# Patient Record
Sex: MALE | Race: WHITE | Employment: OTHER | URBAN - METROPOLITAN AREA
[De-identification: names, ages, dates, MRNs, and addresses within clinical notes are randomized per-mention and may not be internally consistent; named-entity substitution may affect disease eponyms.]

---

## 2017-06-26 ENCOUNTER — HOSPITAL ENCOUNTER (OUTPATIENT)
Age: 72
Setting detail: SPECIMEN
Discharge: HOME | End: 2017-06-26
Payer: MEDICARE

## 2017-06-26 DIAGNOSIS — Z12.5 PROSTATE CANCER SCREENING: ICD-10-CM

## 2017-06-26 DIAGNOSIS — E55.9 VITAMIN D DEFICIENCY: ICD-10-CM

## 2017-06-26 DIAGNOSIS — E78.5 DYSLIPIDEMIA: ICD-10-CM

## 2017-06-26 DIAGNOSIS — I10 ESSENTIAL HYPERTENSION: ICD-10-CM

## 2017-06-26 LAB
ALBUMIN SERPL-MCNC: 4.2 G/DL (ref 3.5–5.2)
ALBUMIN/GLOBULIN RATIO: 1.2 (ref 1–2.5)
ALP BLD-CCNC: 64 U/L (ref 40–129)
ALT SERPL-CCNC: 19 U/L (ref 5–41)
ANION GAP SERPL CALCULATED.3IONS-SCNC: 14 MMOL/L (ref 9–17)
AST SERPL-CCNC: 16 U/L
BILIRUB SERPL-MCNC: 0.47 MG/DL (ref 0.3–1.2)
BUN BLDV-MCNC: 17 MG/DL (ref 8–23)
BUN/CREAT BLD: ABNORMAL (ref 9–20)
CALCIUM SERPL-MCNC: 9.5 MG/DL (ref 8.6–10.4)
CHLORIDE BLD-SCNC: 96 MMOL/L (ref 98–107)
CHOLESTEROL/HDL RATIO: 4.3
CHOLESTEROL: 217 MG/DL
CO2: 25 MMOL/L (ref 20–31)
CREAT SERPL-MCNC: 1.19 MG/DL (ref 0.7–1.2)
GFR AFRICAN AMERICAN: >60 ML/MIN
GFR NON-AFRICAN AMERICAN: >60 ML/MIN
GFR SERPL CREATININE-BSD FRML MDRD: ABNORMAL ML/MIN/{1.73_M2}
GFR SERPL CREATININE-BSD FRML MDRD: ABNORMAL ML/MIN/{1.73_M2}
GLUCOSE BLD-MCNC: 89 MG/DL (ref 70–99)
HCT VFR BLD CALC: 45.2 % (ref 41–53)
HDLC SERPL-MCNC: 51 MG/DL
HEMOGLOBIN: 15 G/DL (ref 13.5–17.5)
LDL CHOLESTEROL: 133 MG/DL (ref 0–130)
MCH RBC QN AUTO: 30.8 PG (ref 26–34)
MCHC RBC AUTO-ENTMCNC: 33.3 G/DL (ref 31–37)
MCV RBC AUTO: 92.7 FL (ref 80–100)
PDW BLD-RTO: 14.2 % (ref 12.5–15.4)
PLATELET # BLD: 351 K/UL (ref 140–450)
PMV BLD AUTO: 8.4 FL (ref 6–12)
POTASSIUM SERPL-SCNC: 4.5 MMOL/L (ref 3.7–5.3)
PROSTATE SPECIFIC ANTIGEN: 1.11 UG/L
RBC # BLD: 4.88 M/UL (ref 4.5–5.9)
SODIUM BLD-SCNC: 135 MMOL/L (ref 135–144)
TOTAL PROTEIN: 7.7 G/DL (ref 6.4–8.3)
TRIGL SERPL-MCNC: 167 MG/DL
VITAMIN D 25-HYDROXY: 21.8 NG/ML (ref 30–100)
VLDLC SERPL CALC-MCNC: ABNORMAL MG/DL (ref 1–30)
WBC # BLD: 10 K/UL (ref 3.5–11)

## 2018-02-12 ENCOUNTER — APPOINTMENT (OUTPATIENT)
Dept: CT IMAGING | Age: 73
DRG: 684 | End: 2018-02-12
Payer: MEDICARE

## 2018-02-12 ENCOUNTER — HOSPITAL ENCOUNTER (INPATIENT)
Age: 73
LOS: 2 days | Discharge: HOME | DRG: 684 | End: 2018-02-14
Attending: EMERGENCY MEDICINE | Admitting: FAMILY MEDICINE
Payer: MEDICARE

## 2018-02-12 DIAGNOSIS — N39.0 URINARY TRACT INFECTION WITHOUT HEMATURIA, SITE UNSPECIFIED: ICD-10-CM

## 2018-02-12 DIAGNOSIS — N17.9 AKI (ACUTE KIDNEY INJURY) (HCC): ICD-10-CM

## 2018-02-12 DIAGNOSIS — R33.9 URINARY RETENTION: Primary | ICD-10-CM

## 2018-02-12 LAB
-: ABNORMAL
ABSOLUTE EOS #: 0.2 K/UL (ref 0–0.4)
ABSOLUTE IMMATURE GRANULOCYTE: ABNORMAL K/UL (ref 0–0.3)
ABSOLUTE LYMPH #: 1.5 K/UL (ref 1–4.8)
ABSOLUTE MONO #: 1.2 K/UL (ref 0.1–1.3)
AMORPHOUS: ABNORMAL
ANION GAP SERPL CALCULATED.3IONS-SCNC: 18 MMOL/L (ref 9–17)
BACTERIA: ABNORMAL
BASOPHILS # BLD: 1 % (ref 0–2)
BASOPHILS ABSOLUTE: 0.1 K/UL (ref 0–0.2)
BILIRUBIN URINE: ABNORMAL
BUN BLDV-MCNC: 30 MG/DL (ref 8–23)
BUN/CREAT BLD: ABNORMAL (ref 9–20)
CALCIUM SERPL-MCNC: 9.2 MG/DL (ref 8.6–10.4)
CASTS UA: ABNORMAL /LPF
CHLORIDE BLD-SCNC: 97 MMOL/L (ref 98–107)
CO2: 21 MMOL/L (ref 20–31)
COLOR: ABNORMAL
COMMENT UA: ABNORMAL
CREAT SERPL-MCNC: 2.41 MG/DL (ref 0.7–1.2)
CRYSTALS, UA: ABNORMAL /HPF
DIFFERENTIAL TYPE: ABNORMAL
EOSINOPHILS RELATIVE PERCENT: 2 % (ref 0–4)
EPITHELIAL CELLS UA: ABNORMAL /HPF
GFR AFRICAN AMERICAN: 32 ML/MIN
GFR NON-AFRICAN AMERICAN: 27 ML/MIN
GFR SERPL CREATININE-BSD FRML MDRD: ABNORMAL ML/MIN/{1.73_M2}
GFR SERPL CREATININE-BSD FRML MDRD: ABNORMAL ML/MIN/{1.73_M2}
GLUCOSE BLD-MCNC: 116 MG/DL (ref 70–99)
GLUCOSE URINE: NEGATIVE
HCT VFR BLD CALC: 42.1 % (ref 41–53)
HEMOGLOBIN: 14.4 G/DL (ref 13.5–17.5)
IMMATURE GRANULOCYTES: ABNORMAL %
KETONES, URINE: NEGATIVE
LEUKOCYTE ESTERASE, URINE: NEGATIVE
LYMPHOCYTES # BLD: 12 % (ref 24–44)
MCH RBC QN AUTO: 31.3 PG (ref 26–34)
MCHC RBC AUTO-ENTMCNC: 34.3 G/DL (ref 31–37)
MCV RBC AUTO: 91.4 FL (ref 80–100)
MONOCYTES # BLD: 10 % (ref 1–7)
MUCUS: ABNORMAL
NITRITE, URINE: NEGATIVE
NRBC AUTOMATED: ABNORMAL PER 100 WBC
OTHER OBSERVATIONS UA: ABNORMAL
PDW BLD-RTO: 13.4 % (ref 11.5–14.9)
PH UA: 5 (ref 5–8)
PLATELET # BLD: 254 K/UL (ref 150–450)
PLATELET ESTIMATE: ABNORMAL
PMV BLD AUTO: 8.3 FL (ref 6–12)
POTASSIUM SERPL-SCNC: 4 MMOL/L (ref 3.7–5.3)
PROTEIN UA: ABNORMAL
RBC # BLD: 4.6 M/UL (ref 4.5–5.9)
RBC # BLD: ABNORMAL 10*6/UL
RBC UA: ABNORMAL /HPF
RENAL EPITHELIAL, UA: ABNORMAL /HPF
SEG NEUTROPHILS: 75 % (ref 36–66)
SEGMENTED NEUTROPHILS ABSOLUTE COUNT: 9.6 K/UL (ref 1.3–9.1)
SODIUM BLD-SCNC: 136 MMOL/L (ref 135–144)
SPECIFIC GRAVITY UA: 1.01 (ref 1–1.03)
TRICHOMONAS: ABNORMAL
TURBIDITY: ABNORMAL
URINE HGB: NEGATIVE
UROBILINOGEN, URINE: NORMAL
WBC # BLD: 12.6 K/UL (ref 3.5–11)
WBC # BLD: ABNORMAL 10*3/UL
WBC UA: ABNORMAL /HPF
YEAST: ABNORMAL

## 2018-02-12 PROCEDURE — 85025 COMPLETE CBC W/AUTO DIFF WBC: CPT

## 2018-02-12 PROCEDURE — 96374 THER/PROPH/DIAG INJ IV PUSH: CPT

## 2018-02-12 PROCEDURE — 36415 COLL VENOUS BLD VENIPUNCTURE: CPT

## 2018-02-12 PROCEDURE — 2580000003 HC RX 258: Performed by: EMERGENCY MEDICINE

## 2018-02-12 PROCEDURE — 80048 BASIC METABOLIC PNL TOTAL CA: CPT

## 2018-02-12 PROCEDURE — 99285 EMERGENCY DEPT VISIT HI MDM: CPT

## 2018-02-12 PROCEDURE — 87086 URINE CULTURE/COLONY COUNT: CPT

## 2018-02-12 PROCEDURE — 74176 CT ABD & PELVIS W/O CONTRAST: CPT

## 2018-02-12 PROCEDURE — 6360000002 HC RX W HCPCS: Performed by: EMERGENCY MEDICINE

## 2018-02-12 PROCEDURE — 1200000000 HC SEMI PRIVATE

## 2018-02-12 PROCEDURE — 81001 URINALYSIS AUTO W/SCOPE: CPT

## 2018-02-12 RX ORDER — 0.9 % SODIUM CHLORIDE 0.9 %
1000 INTRAVENOUS SOLUTION INTRAVENOUS ONCE
Status: COMPLETED | OUTPATIENT
Start: 2018-02-12 | End: 2018-02-12

## 2018-02-12 RX ORDER — 0.9 % SODIUM CHLORIDE 0.9 %
100 INTRAVENOUS SOLUTION INTRAVENOUS ONCE
Status: COMPLETED | OUTPATIENT
Start: 2018-02-12 | End: 2018-02-13

## 2018-02-12 RX ORDER — SODIUM CHLORIDE 0.9 % (FLUSH) 0.9 %
10 SYRINGE (ML) INJECTION PRN
Status: DISCONTINUED | OUTPATIENT
Start: 2018-02-12 | End: 2018-02-14 | Stop reason: HOSPADM

## 2018-02-12 RX ADMIN — CEFTRIAXONE SODIUM 1 G: 1 INJECTION, POWDER, FOR SOLUTION INTRAMUSCULAR; INTRAVENOUS at 23:14

## 2018-02-12 RX ADMIN — SODIUM CHLORIDE 1000 ML: 9 INJECTION, SOLUTION INTRAVENOUS at 21:48

## 2018-02-12 ASSESSMENT — PAIN SCALES - GENERAL
PAINLEVEL_OUTOF10: 0
PAINLEVEL_OUTOF10: 5

## 2018-02-12 ASSESSMENT — ENCOUNTER SYMPTOMS
DIARRHEA: 1
COUGH: 0
ABDOMINAL PAIN: 1
VOMITING: 0
NAUSEA: 0
CONSTIPATION: 1
RHINORRHEA: 0
SHORTNESS OF BREATH: 0

## 2018-02-12 ASSESSMENT — PAIN DESCRIPTION - LOCATION: LOCATION: ABDOMEN

## 2018-02-12 ASSESSMENT — PAIN DESCRIPTION - PAIN TYPE: TYPE: ACUTE PAIN

## 2018-02-13 PROBLEM — K56.41 FECAL IMPACTION IN RECTUM (HCC): Status: ACTIVE | Noted: 2018-02-13

## 2018-02-13 PROBLEM — E66.3 OVERWEIGHT (BMI 25.0-29.9): Status: ACTIVE | Noted: 2018-02-13

## 2018-02-13 LAB
ALBUMIN SERPL-MCNC: 3.5 G/DL (ref 3.5–5.2)
ALBUMIN/GLOBULIN RATIO: ABNORMAL (ref 1–2.5)
ALP BLD-CCNC: 42 U/L (ref 40–129)
ALT SERPL-CCNC: 10 U/L (ref 5–41)
ANION GAP SERPL CALCULATED.3IONS-SCNC: 13 MMOL/L (ref 9–17)
AST SERPL-CCNC: 13 U/L
BILIRUB SERPL-MCNC: 0.55 MG/DL (ref 0.3–1.2)
BUN BLDV-MCNC: 24 MG/DL (ref 8–23)
BUN/CREAT BLD: ABNORMAL (ref 9–20)
CALCIUM SERPL-MCNC: 8.8 MG/DL (ref 8.6–10.4)
CHLORIDE BLD-SCNC: 106 MMOL/L (ref 98–107)
CO2: 21 MMOL/L (ref 20–31)
CREAT SERPL-MCNC: 1.63 MG/DL (ref 0.7–1.2)
CULTURE: NO GROWTH
CULTURE: NORMAL
GFR AFRICAN AMERICAN: 51 ML/MIN
GFR NON-AFRICAN AMERICAN: 42 ML/MIN
GFR SERPL CREATININE-BSD FRML MDRD: ABNORMAL ML/MIN/{1.73_M2}
GFR SERPL CREATININE-BSD FRML MDRD: ABNORMAL ML/MIN/{1.73_M2}
GLUCOSE BLD-MCNC: 108 MG/DL (ref 70–99)
HCT VFR BLD CALC: 41.8 % (ref 41–53)
HEMOGLOBIN: 14.2 G/DL (ref 13.5–17.5)
Lab: NORMAL
MCH RBC QN AUTO: 31.3 PG (ref 26–34)
MCHC RBC AUTO-ENTMCNC: 33.9 G/DL (ref 31–37)
MCV RBC AUTO: 92.3 FL (ref 80–100)
NRBC AUTOMATED: NORMAL PER 100 WBC
PDW BLD-RTO: 13.9 % (ref 11.5–14.9)
PLATELET # BLD: 244 K/UL (ref 150–450)
PMV BLD AUTO: 8 FL (ref 6–12)
POTASSIUM SERPL-SCNC: 4.2 MMOL/L (ref 3.7–5.3)
RBC # BLD: 4.53 M/UL (ref 4.5–5.9)
SODIUM BLD-SCNC: 140 MMOL/L (ref 135–144)
SPECIMEN DESCRIPTION: NORMAL
SPECIMEN DESCRIPTION: NORMAL
STATUS: NORMAL
TOTAL PROTEIN: 6.8 G/DL (ref 6.4–8.3)
WBC # BLD: 10 K/UL (ref 3.5–11)

## 2018-02-13 PROCEDURE — 96372 THER/PROPH/DIAG INJ SC/IM: CPT

## 2018-02-13 PROCEDURE — 6370000000 HC RX 637 (ALT 250 FOR IP): Performed by: FAMILY MEDICINE

## 2018-02-13 PROCEDURE — 36415 COLL VENOUS BLD VENIPUNCTURE: CPT

## 2018-02-13 PROCEDURE — 2580000003 HC RX 258: Performed by: EMERGENCY MEDICINE

## 2018-02-13 PROCEDURE — 2580000003 HC RX 258: Performed by: FAMILY MEDICINE

## 2018-02-13 PROCEDURE — 1200000000 HC SEMI PRIVATE

## 2018-02-13 PROCEDURE — 80053 COMPREHEN METABOLIC PANEL: CPT

## 2018-02-13 PROCEDURE — 6360000002 HC RX W HCPCS: Performed by: FAMILY MEDICINE

## 2018-02-13 PROCEDURE — 99223 1ST HOSP IP/OBS HIGH 75: CPT | Performed by: FAMILY MEDICINE

## 2018-02-13 PROCEDURE — 85027 COMPLETE CBC AUTOMATED: CPT

## 2018-02-13 RX ORDER — ACETAMINOPHEN 325 MG/1
650 TABLET ORAL EVERY 4 HOURS PRN
Status: DISCONTINUED | OUTPATIENT
Start: 2018-02-13 | End: 2018-02-14 | Stop reason: HOSPADM

## 2018-02-13 RX ORDER — OXYCODONE HYDROCHLORIDE AND ACETAMINOPHEN 5; 325 MG/1; MG/1
1 TABLET ORAL EVERY 6 HOURS PRN
Status: DISCONTINUED | OUTPATIENT
Start: 2018-02-13 | End: 2018-02-14 | Stop reason: HOSPADM

## 2018-02-13 RX ORDER — LISINOPRIL 10 MG/1
10 TABLET ORAL DAILY
Status: DISCONTINUED | OUTPATIENT
Start: 2018-02-13 | End: 2018-02-14 | Stop reason: HOSPADM

## 2018-02-13 RX ORDER — AMLODIPINE BESYLATE 5 MG/1
5 TABLET ORAL DAILY
Status: DISCONTINUED | OUTPATIENT
Start: 2018-02-13 | End: 2018-02-14 | Stop reason: HOSPADM

## 2018-02-13 RX ORDER — ASPIRIN 81 MG/1
81 TABLET ORAL DAILY
Status: DISCONTINUED | OUTPATIENT
Start: 2018-02-13 | End: 2018-02-14 | Stop reason: HOSPADM

## 2018-02-13 RX ORDER — AMLODIPINE BESYLATE AND BENAZEPRIL HYDROCHLORIDE 5; 10 MG/1; MG/1
1 CAPSULE ORAL DAILY
Status: DISCONTINUED | OUTPATIENT
Start: 2018-02-13 | End: 2018-02-13 | Stop reason: CLARIF

## 2018-02-13 RX ORDER — ALBUTEROL SULFATE 2.5 MG/3ML
2.5 SOLUTION RESPIRATORY (INHALATION) EVERY 6 HOURS PRN
Status: DISCONTINUED | OUTPATIENT
Start: 2018-02-13 | End: 2018-02-14 | Stop reason: HOSPADM

## 2018-02-13 RX ORDER — SODIUM CHLORIDE 0.9 % (FLUSH) 0.9 %
10 SYRINGE (ML) INJECTION EVERY 12 HOURS SCHEDULED
Status: DISCONTINUED | OUTPATIENT
Start: 2018-02-13 | End: 2018-02-14 | Stop reason: HOSPADM

## 2018-02-13 RX ORDER — SODIUM CHLORIDE 9 MG/ML
INJECTION, SOLUTION INTRAVENOUS CONTINUOUS
Status: DISCONTINUED | OUTPATIENT
Start: 2018-02-13 | End: 2018-02-14 | Stop reason: HOSPADM

## 2018-02-13 RX ORDER — ONDANSETRON 2 MG/ML
4 INJECTION INTRAMUSCULAR; INTRAVENOUS EVERY 6 HOURS PRN
Status: DISCONTINUED | OUTPATIENT
Start: 2018-02-13 | End: 2018-02-14 | Stop reason: HOSPADM

## 2018-02-13 RX ORDER — GABAPENTIN 300 MG/1
300 CAPSULE ORAL 3 TIMES DAILY
Status: DISCONTINUED | OUTPATIENT
Start: 2018-02-13 | End: 2018-02-14 | Stop reason: HOSPADM

## 2018-02-13 RX ORDER — ACETAMINOPHEN 160 MG
2000 TABLET,DISINTEGRATING ORAL DAILY
COMMUNITY

## 2018-02-13 RX ORDER — ASPIRIN 81 MG/1
81 TABLET ORAL DAILY
COMMUNITY

## 2018-02-13 RX ORDER — SODIUM CHLORIDE 0.9 % (FLUSH) 0.9 %
10 SYRINGE (ML) INJECTION PRN
Status: DISCONTINUED | OUTPATIENT
Start: 2018-02-13 | End: 2018-02-14 | Stop reason: HOSPADM

## 2018-02-13 RX ADMIN — ASPIRIN 81 MG: 81 TABLET, COATED ORAL at 13:45

## 2018-02-13 RX ADMIN — VITAMIN D, TAB 1000IU (100/BT) 2000 UNITS: 25 TAB at 13:45

## 2018-02-13 RX ADMIN — MOMETASONE FUROATE AND FORMOTEROL FUMARATE DIHYDRATE 2 PUFF: 200; 5 AEROSOL RESPIRATORY (INHALATION) at 07:46

## 2018-02-13 RX ADMIN — MOMETASONE FUROATE AND FORMOTEROL FUMARATE DIHYDRATE 2 PUFF: 200; 5 AEROSOL RESPIRATORY (INHALATION) at 21:05

## 2018-02-13 RX ADMIN — GABAPENTIN 300 MG: 300 CAPSULE ORAL at 07:46

## 2018-02-13 RX ADMIN — AMLODIPINE BESYLATE 5 MG: 5 TABLET ORAL at 07:46

## 2018-02-13 RX ADMIN — ENOXAPARIN SODIUM 40 MG: 40 INJECTION SUBCUTANEOUS at 21:06

## 2018-02-13 RX ADMIN — TIOTROPIUM BROMIDE 18 MCG: 18 CAPSULE ORAL; RESPIRATORY (INHALATION) at 07:46

## 2018-02-13 RX ADMIN — LISINOPRIL 10 MG: 10 TABLET ORAL at 07:46

## 2018-02-13 RX ADMIN — SODIUM CHLORIDE: 9 INJECTION, SOLUTION INTRAVENOUS at 00:14

## 2018-02-13 RX ADMIN — MAGNESIUM HYDROXIDE 30 ML: 400 SUSPENSION ORAL at 07:46

## 2018-02-13 RX ADMIN — SODIUM CHLORIDE 100 ML: 0.9 INJECTION, SOLUTION INTRAVENOUS at 00:07

## 2018-02-13 ASSESSMENT — PAIN SCALES - GENERAL
PAINLEVEL_OUTOF10: 0
PAINLEVEL_OUTOF10: 0

## 2018-02-13 NOTE — CARE COORDINATION
CASE MANAGEMENT NOTE:    Admission Date:  2/12/2018 Laura Abernathy is a 71 y.o.  male    Admitted for : HUY (acute kidney injury) ([facility] [address] [N17.9]  HUY (acute kidney injury) ([facility] [address] [N17.9]    Met with:  Patient (who is deaf)    PCP:  Dr. Annette Miramontes:  [employer]      Current Residence/ Living Arrangements:  independently at home with spouse. Current Services PTA:  No    Is patient agreeable to VNS: No.  Pt states he has 2 daughter in laws who are nurses and 2 grand-daughters who are nurses as well. Freedom of choice provided: Yes         VNS chosen:  NA    DME:  none    Home Oxygen: No    Nebulizer: No    Supplier: N/A    Potential Assistance Needed: No    SNF needed: No    Pharmacy:  [facility]       Does Patient want to use MEDS to BEDS? No    Family Members/Caregivers that pt would like involved in their care:    Yes    If yes, list name here:  Pat    Transportation Provider:  Family                      Discharge Plan:  Home without needs.                Readmission Risk              Readmission Risk:        12.25       Age 72 or Greater:  1    Admitted from SNF or Requires Paid or Family Care:  0    Currently has CHF,COPD,ARF,CRI,or is on dialysis:  4    Takes more than 5 Prescription Medications:  4    Takes Digoxin,Insulin,Anticoagulants,Narcotics or ASA/Plavix:  [facility] in Past 12 Months:  0    On Disability:  0    Patient Considers own Health:  1.25          Electronically signed by: Fallon Barcenas RN on 2/13/2018 at 11:36 AM

## 2018-02-13 NOTE — ED PROVIDER NOTES
PHYSICAL EXAM   (up to 7 for level 4, 8 or more for level 5)      INITIAL VITALS:   BP (!) 157/84   Pulse 91   Temp 97.9 °F (36.6 °C) (Oral)   Resp 18   Ht 5' 8\" (1.727 m)   Wt 200 lb (90.7 kg)   SpO2 95%   BMI 30.41 kg/m²     Physical Exam   Constitutional: He is oriented to person, place, and time. He appears well-developed and well-nourished. HENT:   Head: Normocephalic and atraumatic. Mouth/Throat: Oropharynx is clear and moist.   Eyes: Conjunctivae and EOM are normal. Pupils are equal, round, and reactive to light. Neck: Neck supple. Cardiovascular: Normal rate, regular rhythm, normal heart sounds and intact distal pulses. Exam reveals no gallop and no friction rub. No murmur heard. Pulmonary/Chest: Effort normal and breath sounds normal. No respiratory distress. He has no wheezes. He has no rales. Abdominal: Soft. He exhibits no distension. There is tenderness in the suprapubic area. There is no rigidity, no rebound, no guarding and no tenderness at McBurney's point. Musculoskeletal: He exhibits no edema, tenderness or deformity. Lymphadenopathy:     He has no cervical adenopathy. Neurological: He is alert and oriented to person, place, and time. Skin: Skin is warm and dry. No rash noted. He is not diaphoretic.        DIFFERENTIAL  DIAGNOSIS     PLAN (LABS / IMAGING / EKG):  Orders Placed This Encounter   Procedures    Urine Culture    CT ABDOMEN PELVIS WO CONTRAST Additional Contrast? None    Urinalysis with Microscopic    CBC Auto Differential    Basic Metabolic Panel    ICTOTEST, URINE    Insert indwelling urinary catheter    Misc nursing order (specify)    Inpatient consult to Primary Care Provider    Insert peripheral IV       MEDICATIONS ORDERED:  Orders Placed This Encounter   Medications    0.9 % sodium chloride bolus    iopamidol (ISOVUE-370) 76 % injection 100 mL    0.9 % sodium chloride bolus    sodium chloride flush 0.9 % injection 10 mL   

## 2018-02-13 NOTE — ED NOTES
Pt to ED with c/o urinary retention for 12 hours and diarrhea for several days. Pt is 100% deaf. He reports pain throughout his entire stomach. Pt states he has been eating/drinking his baseline but daughter is unsure. Pt has dry mucous membranes.       Galo Hennessy RN  02/12/18 [id number]

## 2018-02-14 VITALS
TEMPERATURE: 97.3 F | RESPIRATION RATE: 16 BRPM | SYSTOLIC BLOOD PRESSURE: 104 MMHG | BODY MASS INDEX: 28.83 KG/M2 | HEART RATE: 82 BPM | OXYGEN SATURATION: 91 % | HEIGHT: 68 IN | WEIGHT: 190.26 LBS | DIASTOLIC BLOOD PRESSURE: 67 MMHG

## 2018-02-14 LAB
BUN BLDV-MCNC: 22 MG/DL (ref 8–23)
CREAT SERPL-MCNC: 1.06 MG/DL (ref 0.7–1.2)
GFR AFRICAN AMERICAN: >60 ML/MIN
GFR NON-AFRICAN AMERICAN: >60 ML/MIN
GFR SERPL CREATININE-BSD FRML MDRD: NORMAL ML/MIN/{1.73_M2}
GFR SERPL CREATININE-BSD FRML MDRD: NORMAL ML/MIN/{1.73_M2}

## 2018-02-14 PROCEDURE — 82565 ASSAY OF CREATININE: CPT

## 2018-02-14 PROCEDURE — 99239 HOSP IP/OBS DSCHRG MGMT >30: CPT | Performed by: FAMILY MEDICINE

## 2018-02-14 PROCEDURE — 96372 THER/PROPH/DIAG INJ SC/IM: CPT

## 2018-02-14 PROCEDURE — 6360000002 HC RX W HCPCS: Performed by: FAMILY MEDICINE

## 2018-02-14 PROCEDURE — 84520 ASSAY OF UREA NITROGEN: CPT

## 2018-02-14 PROCEDURE — 2580000003 HC RX 258: Performed by: FAMILY MEDICINE

## 2018-02-14 PROCEDURE — 36415 COLL VENOUS BLD VENIPUNCTURE: CPT

## 2018-02-14 PROCEDURE — 6370000000 HC RX 637 (ALT 250 FOR IP): Performed by: FAMILY MEDICINE

## 2018-02-14 RX ADMIN — AMLODIPINE BESYLATE 5 MG: 5 TABLET ORAL at 08:40

## 2018-02-14 RX ADMIN — MOMETASONE FUROATE AND FORMOTEROL FUMARATE DIHYDRATE 2 PUFF: 200; 5 AEROSOL RESPIRATORY (INHALATION) at 08:40

## 2018-02-14 RX ADMIN — LISINOPRIL 10 MG: 10 TABLET ORAL at 08:40

## 2018-02-14 RX ADMIN — ASPIRIN 81 MG: 81 TABLET, COATED ORAL at 08:40

## 2018-02-14 RX ADMIN — VITAMIN D, TAB 1000IU (100/BT) 2000 UNITS: 25 TAB at 08:40

## 2018-02-14 RX ADMIN — GABAPENTIN 300 MG: 300 CAPSULE ORAL at 08:41

## 2018-02-14 RX ADMIN — SODIUM CHLORIDE: 9 INJECTION, SOLUTION INTRAVENOUS at 08:45

## 2018-02-14 RX ADMIN — TIOTROPIUM BROMIDE 18 MCG: 18 CAPSULE ORAL; RESPIRATORY (INHALATION) at 08:42

## 2018-02-14 RX ADMIN — ENOXAPARIN SODIUM 40 MG: 40 INJECTION SUBCUTANEOUS at 08:40

## 2018-02-14 RX ADMIN — SODIUM CHLORIDE: 9 INJECTION, SOLUTION INTRAVENOUS at 13:52

## 2018-02-14 NOTE — PROGRESS NOTES
Perfect serve at 5:08pm sent to Dr. Wen Nair regarding VTE. Gemma PEPPER at 1905 regarding on chemical VTE. Awaiting return page.

## 2018-02-14 NOTE — H&P
(HYDRODIURIL) 25 MG tablet Take 1 tablet by mouth daily for 30 days. 3/5/13 12/11/13   Johny Mazariegos MD   gabapentin (NEURONTIN) 300 MG capsule Take 1 capsule by mouth 3 times daily. 11/26/12     Khoa Alvarado MD   oxyCODONE-acetaminophen (PERCOCET) 5-325 MG per tablet 1 tablet every 6 hours as needed. 9/19/12     Historical Provider, MD            Allergies:  Review of patient's allergies indicates no known allergies.     Social History:  The patient currently lives at home     TOBACCO:   reports that he quit smoking about 11 years ago. He has never used smokeless tobacco.  ETOH:   reports that he drinks alcohol.     Review of Systems - General ROS: negative  Psychological ROS: negative  Ophthalmic ROS: positive for - uses glasses  ENT ROS: positive for - hearing loss  Respiratory ROS: positive for - COPD  Cardiovascular ROS: negative  Gastrointestinal ROS: positive for - abdominal pain, constipation and diarrhea  Genito-Urinary ROS: positive for - urinary retention  Musculoskeletal ROS: positive for - muscle pain  Neurological ROS: negative        Family History:       Family History       Problem Relation Age of Onset    Cancer Mother 80       breast, lung    Heart Disease Father 78       heart attack            PHYSICAL EXAM:     BP (!) 143/59   Pulse 96   Temp 98 °F (36.7 °C) (Oral)   Resp 16   Ht 5' 8\" (1.727 m)   Wt 191 lb 2.2 oz (86.7 kg)   SpO2 94%   BMI 29.06 kg/m²      General appearance: No apparent distress appears stated age and cooperative. HEENT Normal cephalic, atraumatic without obvious deformity. Pupils equal, round, and reactive to light. Extra ocular muscles intact. Conjunctivae/corneas clear. Neck: Supple, No jugular venous distention/bruits. Trachea midline without thyromegaly or adenopathy with full range of motion. Lungs: Clear to auscultation, bilaterally without Rales/Wheezes/Rhonchi with good respiratory effort.   Heart: Regular rate and rhythm with Normal S1/S2 without

## 2018-02-14 NOTE — PROGRESS NOTES
Nutrition Assessment    Type and Reason for Visit: Positive Nutrition Screen (unplanned weight loss)    Malnutrition Assessment:  · Malnutrition Status: No malnutrition    Nutrition Diagnosis:   · Problem: Altered GI function  · Etiology: Diarrhea    Signs and symptoms:  (stool softener/ laxative induced diarrhea after bowel blockage)    Nutrition Assessment:  · Subjective Assessment: Patient is deaf but communicates well using paper and pen. Writer wrote down questions on a piece of paper and patient verbally answered questions. Patient denied any significant weight loss and state that his weight fluctuates up and down 2-3 lbs. Patient states he had a bowel blockage and was given stool softeners so now he has diarrhea. Patient states that yesterday (2/13) he had diarrhea after eating breakfast and lunch and decided not to eat dinner. Patient did not eat breakfast today for fear to diarrhea but states he would like to eat lunch. Nutrition Risk Level   Risk Level: Low    Nutrition Intervention  Food and/or Delivery: Continue current diet  Nutrition Education/Counseling/Coordination of Care:  Continued Inpatient Monitoring    Patient assessed for nutrition risk. Deemed to be at low risk at this time. Will continue to follow patient.       Monik VILLEGAS RSALBAODR, L.D,  Clinical Dietitian  Pager # [phone]

## 2018-02-14 NOTE — PROGRESS NOTES
FAMILY MEDICINE  - PROGRESS NOTE    Date:  2/14/2018  Laura Abernathy  [id number]      Chief Complaint   Patient presents with    Urinary Retention         Interval History:  Improved, he feels better and has no new complaints. His BUN/CR have improved but are not back to baseline. He has good bowel sounds and has had some small BM's.       Subjective  Constitutional: negative  Eyes: positive for contacts/glasses  Ears, nose, mouth, throat, and face: positive for hearing loss  Respiratory: positive for emphysema  Cardiovascular: negative  Gastrointestinal: positive for constipation  Genitourinary:positive for retention  Musculoskeletal:positive for arthralgias and myalgias  Neurological: negative  Behavioral/Psych: positive for overweight:    Objective:    BP (!) 143/61   Pulse 80   Temp 97.7 °F (36.5 °C) (Oral)   Resp 16   Ht 5' 8\" (1.727 m)   Wt 190 lb 4.1 oz (86.3 kg)   SpO2 92%   BMI 28.93 kg/m²   General appearance - alert, well appearing, and in no distress and overweight  Mental status - alert, oriented to person, place, and time  Eyes - pupils equal and reactive, extraocular eye movements intact  Ears - bilateral TM's and external ear canals normal  Nose - normal and patent, no erythema, discharge or polyps  Mouth - mucous membranes moist, pharynx normal without lesions  Neck - supple, no significant adenopathy  Lymphatics - no palpable lymphadenopathy, no hepatosplenomegaly  Chest - clear to auscultation, no wheezes, rales or rhonchi, symmetric air entry  Heart - normal rate, regular rhythm, normal S1, S2, no murmurs, rubs, clicks or gallops  Abdomen - soft, nontender, nondistended, no masses or organomegaly  Breasts - not examined  Back exam - full range of motion, no tenderness, palpable spasm or pain on motion  Neurological - alert, oriented, normal speech, no focal findings or movement disorder noted  Musculoskeletal - osteoarthritic changes noted in both hands  Extremities - peripheral pulses normal, no pedal edema, no clubbing or cyanosis  Skin - normal coloration and turgor, no rashes, no suspicious skin lesions noted    Data:   Medications:   Current Facility-Administered Medications   Medication Dose Route Frequency Provider Last Rate Last Dose    sodium chloride flush 0.9 % injection 10 mL  10 mL Intravenous 2 times per day Thomas Beth MD        sodium chloride flush 0.9 % injection 10 mL  10 mL Intravenous PRN Thomas Beth MD        acetaminophen (TYLENOL) tablet 650 mg  650 mg Oral Q4H PRN Thomas Beth MD        magnesium hydroxide (MILK OF MAGNESIA) 400 MG/5ML suspension 30 mL  30 mL Oral Daily PRN Thomas Beth MD   30 mL at 02/13/18 0746    ondansetron (ZOFRAN) injection 4 mg  4 mg Intravenous Q6H PRN Thomas Beth MD        0.9 % sodium chloride infusion   Intravenous Continuous Thomas Beth  mL/hr at 02/13/18 0014      albuterol (PROVENTIL) nebulizer solution 2.5 mg  2.5 mg Nebulization Q6H PRN Thomas Beth MD        tiotropium [facility]) inhalation capsule 18 mcg  18 mcg Inhalation Daily Thomas Beth MD   18 mcg at 02/13/18 0746    mometasone-formoterol (DULERA) 200-5 MCG/ACT inhaler 2 puff  2 puff Inhalation BID Thomas Beth MD   2 puff at 02/13/18 2105    oxyCODONE-acetaminophen (PERCOCET) 5-325 MG per tablet 1 tablet  1 tablet Oral Q6H PRN Thomas Beth MD        gabapentin (NEURONTIN) capsule 300 mg  300 mg Oral TID Thomas Beth MD   300 mg at 02/13/18 0746    amLODIPine (NORVASC) tablet 5 mg  5 mg Oral Daily Thomas Beth MD   5 mg at 02/13/18 0746    And    lisinopril (PRINIVIL;ZESTRIL) tablet 10 mg  10 mg Oral Daily Thomas Beth MD   10 mg at 02/13/18 0746    aspirin EC tablet 81 mg  81 mg Oral Daily Thomas Beth MD   81 mg at 02/13/18 1345    vitamin D (CHOLECALCIFEROL) tablet 2,000 Units  2 tablet Oral Daily Thomas Beth MD   2,000 Units at 02/13/18 5825 HTN (hypertension)     Dyslipidemia     Hearing loss     HUY (acute kidney injury) ([facility] [address]     Overweight (BMI 25.0-29. 9)     Fecal impaction in rectum [facility])           Plan:  Continue current treatments. D/C [facility] for voiding trial.  Repeat BUN/CR. Possible D/C home later today or tomorrow.       See orders   Disposition:    Electronically signed by Arabella Dumas MD on 2/14/2018 at 6:13 AM

## 2018-02-17 NOTE — PROGRESS NOTES
.Date Patient Discharged:02/14/18      Today's Date:02/16/18      Spoke with:      Do you understand the purpose of your medications?:  N/a    Do you understand the side effects of your new medications?:  N/A    Would you like to speak with a pharmacist about any of your medications or the side effects?:  N/A    Were you able to get your prescriptions filled?:  N/A    Did you understand your discharge instructions and what you are responsible for in managing your health after discharge?:  Yes    While you were here, was your call light answered promptly?:  Yes pretty much    Was the area around your room kept quiet at night?:  Yes    Were your room and bathroom kept clean?:Yes

## 2018-02-25 NOTE — DISCHARGE SUMMARY
[facility] Discharge Summary      Patient ID: Esvin Sandoval    MRN: [id number]     Acct:  [de-identified]       Patient's PCP: Anu Mcgill MD    Admit Date: 2/12/2018     Discharge Date: 2/14/2018      Admitting Physician: Anu Mcgill MD    Discharge Physician: Anu Mcgill MD     Discharge Diagnoses:    Primary Problem  <principal problem not specified>    Active Hospital Problems    Diagnosis Date Noted    Overweight (BMI 25.0-29. 9) [E66.3] 02/13/2018    Fecal impaction in rectum ([facility] [address] [K56.41] 02/13/2018    HUY (acute kidney injury) ([facility] [address] [N17.9] 02/12/2018    Hearing loss [H91.90] 06/11/2014    Dyslipidemia [E78.5] 12/11/2013    HTN (hypertension) [I10] 12/11/2013    COPD (chronic obstructive pulmonary disease) ([facility] [address] [J44.9]      Past Medical History:   Diagnosis Date    HUY (acute kidney injury) ([facility] [address] 2/12/2018    COPD (chronic obstructive pulmonary disease) ([facility] [address]     History of shingles 9/12    Hypertension     2012     The patient was seen and examined on day of discharge and this discharge summary is in conjunction with any daily progress note from day of discharge. Code Status:  Prior    Hospital Course: uncomplicated    Consults:  none    Significant Diagnostic Studies: as above, and as follows: see chart    Treatments: as above    Disposition: home    Discharged Condition: Stable    Follow Up: Anu Mcgill MD in one week    Discharge Medications: Siobhan Kelsey   Home Medication Instructions HCX:[id number]    Printed on:02/25/18 3876   Medication Information                      albuterol (PROVENTIL) (2.5 MG/3ML) 0.083% nebulizer solution  Take 3 mLs by nebulization every 6 hours as needed.              amLODIPine-benazepril (LOTREL) 5-10 MG per capsule  TAKE 1 CAPSULE BY MOUTH ONE TIME A DAY              aspirin EC 81 MG EC tablet  Take 81 mg by mouth daily             Cholecalciferol (VITAMIN D3) 2000 units CAPS  Take by mouth             gabapentin

## 2018-03-07 ENCOUNTER — HOSPITAL ENCOUNTER (OUTPATIENT)
Age: 73
Setting detail: SPECIMEN
Discharge: HOME | End: 2018-03-07
Payer: MEDICARE

## 2018-03-07 DIAGNOSIS — N17.9 AKI (ACUTE KIDNEY INJURY) (HCC): ICD-10-CM

## 2018-03-07 DIAGNOSIS — I10 ESSENTIAL HYPERTENSION: ICD-10-CM

## 2018-03-07 LAB
ALBUMIN SERPL-MCNC: 4.4 G/DL (ref 3.5–5.2)
ALBUMIN/GLOBULIN RATIO: 1.3 (ref 1–2.5)
ALP BLD-CCNC: 58 U/L (ref 40–129)
ALT SERPL-CCNC: 15 U/L (ref 5–41)
ANION GAP SERPL CALCULATED.3IONS-SCNC: 13 MMOL/L (ref 9–17)
AST SERPL-CCNC: 15 U/L
BILIRUB SERPL-MCNC: 0.39 MG/DL (ref 0.3–1.2)
BUN BLDV-MCNC: 17 MG/DL (ref 8–23)
BUN/CREAT BLD: NORMAL (ref 9–20)
CALCIUM SERPL-MCNC: 9 MG/DL (ref 8.6–10.4)
CHLORIDE BLD-SCNC: 101 MMOL/L (ref 98–107)
CO2: 25 MMOL/L (ref 20–31)
CREAT SERPL-MCNC: 1.13 MG/DL (ref 0.7–1.2)
GFR AFRICAN AMERICAN: >60 ML/MIN
GFR NON-AFRICAN AMERICAN: >60 ML/MIN
GFR SERPL CREATININE-BSD FRML MDRD: NORMAL ML/MIN/{1.73_M2}
GFR SERPL CREATININE-BSD FRML MDRD: NORMAL ML/MIN/{1.73_M2}
GLUCOSE BLD-MCNC: 92 MG/DL (ref 70–99)
HCT VFR BLD CALC: 46.5 % (ref 40.7–50.3)
HEMOGLOBIN: 14.9 G/DL (ref 13–17)
MCH RBC QN AUTO: 29.7 PG (ref 25.2–33.5)
MCHC RBC AUTO-ENTMCNC: 32 G/DL (ref 28.4–34.8)
MCV RBC AUTO: 92.6 FL (ref 82.6–102.9)
NRBC AUTOMATED: 0 PER 100 WBC
PDW BLD-RTO: 12.8 % (ref 11.8–14.4)
PLATELET # BLD: 330 K/UL (ref 138–453)
PMV BLD AUTO: 10.5 FL (ref 8.1–13.5)
POTASSIUM SERPL-SCNC: 4.5 MMOL/L (ref 3.7–5.3)
RBC # BLD: 5.02 M/UL (ref 4.21–5.77)
SODIUM BLD-SCNC: 139 MMOL/L (ref 135–144)
TOTAL PROTEIN: 7.8 G/DL (ref 6.4–8.3)
WBC # BLD: 7.5 K/UL (ref 3.5–11.3)

## 2018-10-15 ENCOUNTER — HOSPITAL ENCOUNTER (OUTPATIENT)
Facility: CLINIC | Age: 73
Discharge: HOME | End: 2018-10-17
Payer: MEDICARE

## 2018-10-15 ENCOUNTER — HOSPITAL ENCOUNTER (OUTPATIENT)
Dept: GENERAL RADIOLOGY | Facility: CLINIC | Age: 73
Discharge: HOME | End: 2018-10-17
Payer: MEDICARE

## 2018-10-15 DIAGNOSIS — J44.9 CHRONIC OBSTRUCTIVE PULMONARY DISEASE, UNSPECIFIED COPD TYPE (HCC): ICD-10-CM

## 2018-10-15 PROCEDURE — 71046 X-RAY EXAM CHEST 2 VIEWS: CPT

## 2019-09-25 ENCOUNTER — HOSPITAL ENCOUNTER (OUTPATIENT)
Age: 74
Setting detail: SPECIMEN
Discharge: HOME | End: 2019-09-25
Payer: MEDICARE

## 2019-09-25 DIAGNOSIS — I10 ESSENTIAL HYPERTENSION: ICD-10-CM

## 2019-09-25 DIAGNOSIS — E78.5 DYSLIPIDEMIA: ICD-10-CM

## 2019-09-25 LAB
ALBUMIN SERPL-MCNC: 4.3 G/DL (ref 3.5–5.2)
ALBUMIN/GLOBULIN RATIO: 1.3 (ref 1–2.5)
ALP BLD-CCNC: 67 U/L (ref 40–129)
ALT SERPL-CCNC: 17 U/L (ref 5–41)
ANION GAP SERPL CALCULATED.3IONS-SCNC: 14 MMOL/L (ref 9–17)
AST SERPL-CCNC: 16 U/L
BILIRUB SERPL-MCNC: 0.4 MG/DL (ref 0.3–1.2)
BUN BLDV-MCNC: 21 MG/DL (ref 8–23)
BUN/CREAT BLD: ABNORMAL (ref 9–20)
CALCIUM SERPL-MCNC: 9.5 MG/DL (ref 8.6–10.4)
CHLORIDE BLD-SCNC: 102 MMOL/L (ref 98–107)
CHOLESTEROL/HDL RATIO: 3.5
CHOLESTEROL: 227 MG/DL
CO2: 24 MMOL/L (ref 20–31)
CREAT SERPL-MCNC: 1.29 MG/DL (ref 0.7–1.2)
GFR AFRICAN AMERICAN: >60 ML/MIN
GFR NON-AFRICAN AMERICAN: 55 ML/MIN
GFR SERPL CREATININE-BSD FRML MDRD: ABNORMAL ML/MIN/{1.73_M2}
GFR SERPL CREATININE-BSD FRML MDRD: ABNORMAL ML/MIN/{1.73_M2}
GLUCOSE BLD-MCNC: 95 MG/DL (ref 70–99)
HCT VFR BLD CALC: 47.6 % (ref 40.7–50.3)
HDLC SERPL-MCNC: 65 MG/DL
HEMOGLOBIN: 15.5 G/DL (ref 13–17)
LDL CHOLESTEROL: 145 MG/DL (ref 0–130)
MCH RBC QN AUTO: 31.3 PG (ref 25.2–33.5)
MCHC RBC AUTO-ENTMCNC: 32.6 G/DL (ref 28.4–34.8)
MCV RBC AUTO: 96.2 FL (ref 82.6–102.9)
NRBC AUTOMATED: 0 PER 100 WBC
PDW BLD-RTO: 13.1 % (ref 11.8–14.4)
PLATELET # BLD: 300 K/UL (ref 138–453)
PMV BLD AUTO: 10.8 FL (ref 8.1–13.5)
POTASSIUM SERPL-SCNC: 4.6 MMOL/L (ref 3.7–5.3)
RBC # BLD: 4.95 M/UL (ref 4.21–5.77)
SODIUM BLD-SCNC: 140 MMOL/L (ref 135–144)
TOTAL PROTEIN: 7.7 G/DL (ref 6.4–8.3)
TRIGL SERPL-MCNC: 84 MG/DL
VLDLC SERPL CALC-MCNC: ABNORMAL MG/DL (ref 1–30)
WBC # BLD: 6.8 K/UL (ref 3.5–11.3)

## 2020-02-03 ENCOUNTER — NURSE TRIAGE (OUTPATIENT)
Dept: OTHER | Facility: CLINIC | Age: 75
End: 2020-02-03

## 2020-02-03 ENCOUNTER — APPOINTMENT (OUTPATIENT)
Dept: GENERAL RADIOLOGY | Age: 75
DRG: 190 | End: 2020-02-03
Payer: MEDICARE

## 2020-02-03 ENCOUNTER — HOSPITAL ENCOUNTER (INPATIENT)
Age: 75
LOS: 7 days | Discharge: HOME | DRG: 190 | End: 2020-02-10
Attending: EMERGENCY MEDICINE | Admitting: FAMILY MEDICINE
Payer: MEDICARE

## 2020-02-03 PROBLEM — R09.02 HYPOXIA: Status: ACTIVE | Noted: 2020-02-03

## 2020-02-03 LAB
ABSOLUTE EOS #: 0 K/UL (ref 0–0.4)
ABSOLUTE IMMATURE GRANULOCYTE: ABNORMAL K/UL (ref 0–0.3)
ABSOLUTE LYMPH #: 1.74 K/UL (ref 1–4.8)
ABSOLUTE MONO #: 1.9 K/UL (ref 0.1–1.3)
ALBUMIN SERPL-MCNC: 3.7 G/DL (ref 3.5–5.2)
ALBUMIN/GLOBULIN RATIO: ABNORMAL (ref 1–2.5)
ALLEN TEST: ABNORMAL
ALP BLD-CCNC: 65 U/L (ref 40–129)
ALT SERPL-CCNC: 19 U/L (ref 5–41)
ANION GAP SERPL CALCULATED.3IONS-SCNC: 19 MMOL/L (ref 9–17)
AST SERPL-CCNC: 20 U/L
BASOPHILS # BLD: 0 % (ref 0–2)
BASOPHILS ABSOLUTE: 0 K/UL (ref 0–0.2)
BILIRUB SERPL-MCNC: 1.57 MG/DL (ref 0.3–1.2)
BNP INTERPRETATION: ABNORMAL
BUN BLDV-MCNC: 31 MG/DL (ref 8–23)
BUN/CREAT BLD: ABNORMAL (ref 9–20)
CALCIUM SERPL-MCNC: 9.2 MG/DL (ref 8.6–10.4)
CARBOXYHEMOGLOBIN: 3.2 % (ref 0–5)
CHLORIDE BLD-SCNC: 95 MMOL/L (ref 98–107)
CO2: 19 MMOL/L (ref 20–31)
CREAT SERPL-MCNC: 1.53 MG/DL (ref 0.7–1.2)
DIFFERENTIAL TYPE: ABNORMAL
EOSINOPHILS RELATIVE PERCENT: 0 % (ref 0–4)
FIO2: ABNORMAL
GFR AFRICAN AMERICAN: 54 ML/MIN
GFR NON-AFRICAN AMERICAN: 45 ML/MIN
GFR SERPL CREATININE-BSD FRML MDRD: ABNORMAL ML/MIN/{1.73_M2}
GFR SERPL CREATININE-BSD FRML MDRD: ABNORMAL ML/MIN/{1.73_M2}
GLUCOSE BLD-MCNC: 130 MG/DL (ref 70–99)
HCO3 VENOUS: 20.6 MMOL/L (ref 24–30)
HCT VFR BLD CALC: 43.1 % (ref 41–53)
HEMOGLOBIN: 14.2 G/DL (ref 13.5–17.5)
IMMATURE GRANULOCYTES: ABNORMAL %
LYMPHOCYTES # BLD: 11 % (ref 24–44)
MCH RBC QN AUTO: 30.6 PG (ref 26–34)
MCHC RBC AUTO-ENTMCNC: 33 G/DL (ref 31–37)
MCV RBC AUTO: 92.6 FL (ref 80–100)
METHEMOGLOBIN: 0.6 % (ref 0–1.9)
MODE: ABNORMAL
MONOCYTES # BLD: 12 % (ref 1–7)
MORPHOLOGY: NORMAL
NEGATIVE BASE EXCESS, VEN: 4.2 MMOL/L (ref 0–2)
NOTIFICATION TIME: ABNORMAL
NOTIFICATION: ABNORMAL
NRBC AUTOMATED: ABNORMAL PER 100 WBC
O2 DEVICE/FLOW/%: ABNORMAL
O2 SAT, VEN: 91 % (ref 60–85)
OXYHEMOGLOBIN: ABNORMAL % (ref 95–98)
PATIENT TEMP: 37
PCO2, VEN, TEMP ADJ: ABNORMAL MMHG (ref 39–55)
PCO2, VEN: 33.5 (ref 39–55)
PDW BLD-RTO: 12.7 % (ref 11.5–14.9)
PEEP/CPAP: ABNORMAL
PH VENOUS: 7.4 (ref 7.32–7.42)
PH, VEN, TEMP ADJ: ABNORMAL (ref 7.32–7.42)
PLATELET # BLD: 324 K/UL (ref 150–450)
PLATELET ESTIMATE: ABNORMAL
PMV BLD AUTO: 8.8 FL (ref 6–12)
PO2, VEN, TEMP ADJ: ABNORMAL MMHG (ref 30–50)
PO2, VEN: 68.7 (ref 30–50)
POSITIVE BASE EXCESS, VEN: ABNORMAL MMOL/L (ref 0–2)
POTASSIUM SERPL-SCNC: 4.2 MMOL/L (ref 3.7–5.3)
PRO-BNP: 377 PG/ML
PSV: ABNORMAL
PT. POSITION: ABNORMAL
RBC # BLD: 4.66 M/UL (ref 4.5–5.9)
RBC # BLD: ABNORMAL 10*6/UL
RESPIRATORY RATE: ABNORMAL
SAMPLE SITE: ABNORMAL
SEG NEUTROPHILS: 77 % (ref 36–66)
SEGMENTED NEUTROPHILS ABSOLUTE COUNT: 12.16 K/UL (ref 1.3–9.1)
SET RATE: ABNORMAL
SODIUM BLD-SCNC: 133 MMOL/L (ref 135–144)
TEXT FOR RESPIRATORY: ABNORMAL
TOTAL HB: ABNORMAL G/DL (ref 12–16)
TOTAL PROTEIN: 8.1 G/DL (ref 6.4–8.3)
TOTAL RATE: ABNORMAL
TROPONIN INTERP: NORMAL
TROPONIN INTERP: NORMAL
TROPONIN T: NORMAL NG/ML
TROPONIN T: NORMAL NG/ML
TROPONIN, HIGH SENSITIVITY: 16 NG/L (ref 0–22)
TROPONIN, HIGH SENSITIVITY: 18 NG/L (ref 0–22)
VT: ABNORMAL
WBC # BLD: 15.8 K/UL (ref 3.5–11)
WBC # BLD: ABNORMAL 10*3/UL

## 2020-02-03 PROCEDURE — 99285 EMERGENCY DEPT VISIT HI MDM: CPT

## 2020-02-03 PROCEDURE — 93005 ELECTROCARDIOGRAM TRACING: CPT | Performed by: STUDENT IN AN ORGANIZED HEALTH CARE EDUCATION/TRAINING PROGRAM

## 2020-02-03 PROCEDURE — 2700000000 HC OXYGEN THERAPY PER DAY

## 2020-02-03 PROCEDURE — 36415 COLL VENOUS BLD VENIPUNCTURE: CPT

## 2020-02-03 PROCEDURE — 96375 TX/PRO/DX INJ NEW DRUG ADDON: CPT

## 2020-02-03 PROCEDURE — 6370000000 HC RX 637 (ALT 250 FOR IP): Performed by: STUDENT IN AN ORGANIZED HEALTH CARE EDUCATION/TRAINING PROGRAM

## 2020-02-03 PROCEDURE — 80053 COMPREHEN METABOLIC PANEL: CPT

## 2020-02-03 PROCEDURE — 85025 COMPLETE CBC W/AUTO DIFF WBC: CPT

## 2020-02-03 PROCEDURE — 83880 ASSAY OF NATRIURETIC PEPTIDE: CPT

## 2020-02-03 PROCEDURE — 82805 BLOOD GASES W/O2 SATURATION: CPT

## 2020-02-03 PROCEDURE — 96365 THER/PROPH/DIAG IV INF INIT: CPT

## 2020-02-03 PROCEDURE — 6360000002 HC RX W HCPCS: Performed by: STUDENT IN AN ORGANIZED HEALTH CARE EDUCATION/TRAINING PROGRAM

## 2020-02-03 PROCEDURE — 82800 BLOOD PH: CPT

## 2020-02-03 PROCEDURE — 94640 AIRWAY INHALATION TREATMENT: CPT

## 2020-02-03 PROCEDURE — 6370000000 HC RX 637 (ALT 250 FOR IP)

## 2020-02-03 PROCEDURE — 2580000003 HC RX 258: Performed by: STUDENT IN AN ORGANIZED HEALTH CARE EDUCATION/TRAINING PROGRAM

## 2020-02-03 PROCEDURE — 2000000000 HC ICU R&B

## 2020-02-03 PROCEDURE — 71046 X-RAY EXAM CHEST 2 VIEWS: CPT

## 2020-02-03 PROCEDURE — 94660 CPAP INITIATION&MGMT: CPT

## 2020-02-03 PROCEDURE — 84484 ASSAY OF TROPONIN QUANT: CPT

## 2020-02-03 PROCEDURE — 94664 DEMO&/EVAL PT USE INHALER: CPT

## 2020-02-03 RX ORDER — ASPIRIN 81 MG/1
81 TABLET ORAL DAILY
Status: DISCONTINUED | OUTPATIENT
Start: 2020-02-04 | End: 2020-02-10 | Stop reason: HOSPADM

## 2020-02-03 RX ORDER — ALBUTEROL SULFATE 90 UG/1
2 AEROSOL, METERED RESPIRATORY (INHALATION)
Status: DISCONTINUED | OUTPATIENT
Start: 2020-02-03 | End: 2020-02-03

## 2020-02-03 RX ORDER — OSELTAMIVIR PHOSPHATE 30 MG/1
30 CAPSULE ORAL ONCE
Status: COMPLETED | OUTPATIENT
Start: 2020-02-03 | End: 2020-02-03

## 2020-02-03 RX ORDER — VITAMIN B COMPLEX
2000 TABLET ORAL DAILY
Status: DISCONTINUED | OUTPATIENT
Start: 2020-02-04 | End: 2020-02-10 | Stop reason: HOSPADM

## 2020-02-03 RX ORDER — MAGNESIUM SULFATE 1 G/100ML
1 INJECTION INTRAVENOUS
Status: COMPLETED | OUTPATIENT
Start: 2020-02-03 | End: 2020-02-03

## 2020-02-03 RX ORDER — ACETAMINOPHEN 325 MG/1
650 TABLET ORAL EVERY 4 HOURS PRN
Status: DISCONTINUED | OUTPATIENT
Start: 2020-02-03 | End: 2020-02-10 | Stop reason: HOSPADM

## 2020-02-03 RX ORDER — IPRATROPIUM BROMIDE AND ALBUTEROL SULFATE 2.5; .5 MG/3ML; MG/3ML
SOLUTION RESPIRATORY (INHALATION)
Status: COMPLETED
Start: 2020-02-03 | End: 2020-02-03

## 2020-02-03 RX ORDER — 0.9 % SODIUM CHLORIDE 0.9 %
1000 INTRAVENOUS SOLUTION INTRAVENOUS ONCE
Status: COMPLETED | OUTPATIENT
Start: 2020-02-03 | End: 2020-02-03

## 2020-02-03 RX ORDER — METHYLPREDNISOLONE SODIUM SUCCINATE 125 MG/2ML
125 INJECTION, POWDER, LYOPHILIZED, FOR SOLUTION INTRAMUSCULAR; INTRAVENOUS ONCE
Status: COMPLETED | OUTPATIENT
Start: 2020-02-03 | End: 2020-02-03

## 2020-02-03 RX ORDER — CHLORAL HYDRATE 500 MG
1000 CAPSULE ORAL NIGHTLY
COMMUNITY

## 2020-02-03 RX ORDER — LISINOPRIL 10 MG/1
10 TABLET ORAL DAILY
Status: DISCONTINUED | OUTPATIENT
Start: 2020-02-04 | End: 2020-02-10 | Stop reason: HOSPADM

## 2020-02-03 RX ORDER — ALBUTEROL SULFATE 2.5 MG/3ML
2.5 SOLUTION RESPIRATORY (INHALATION) EVERY 6 HOURS PRN
Status: DISCONTINUED | OUTPATIENT
Start: 2020-02-03 | End: 2020-02-10 | Stop reason: HOSPADM

## 2020-02-03 RX ORDER — AMLODIPINE BESYLATE 5 MG/1
5 TABLET ORAL DAILY
Status: DISCONTINUED | OUTPATIENT
Start: 2020-02-04 | End: 2020-02-10 | Stop reason: HOSPADM

## 2020-02-03 RX ORDER — IPRATROPIUM BROMIDE AND ALBUTEROL SULFATE 2.5; .5 MG/3ML; MG/3ML
1 SOLUTION RESPIRATORY (INHALATION)
Status: DISCONTINUED | OUTPATIENT
Start: 2020-02-03 | End: 2020-02-03

## 2020-02-03 RX ORDER — ONDANSETRON 2 MG/ML
4 INJECTION INTRAMUSCULAR; INTRAVENOUS EVERY 8 HOURS PRN
Status: DISCONTINUED | OUTPATIENT
Start: 2020-02-03 | End: 2020-02-10 | Stop reason: HOSPADM

## 2020-02-03 RX ORDER — SODIUM CHLORIDE 0.9 % (FLUSH) 0.9 %
10 SYRINGE (ML) INJECTION PRN
Status: DISCONTINUED | OUTPATIENT
Start: 2020-02-03 | End: 2020-02-10 | Stop reason: HOSPADM

## 2020-02-03 RX ORDER — ALBUTEROL SULFATE 2.5 MG/3ML
5 SOLUTION RESPIRATORY (INHALATION)
Status: DISCONTINUED | OUTPATIENT
Start: 2020-02-03 | End: 2020-02-03

## 2020-02-03 RX ORDER — IPRATROPIUM BROMIDE AND ALBUTEROL SULFATE 2.5; .5 MG/3ML; MG/3ML
1 SOLUTION RESPIRATORY (INHALATION)
Status: DISCONTINUED | OUTPATIENT
Start: 2020-02-03 | End: 2020-02-04

## 2020-02-03 RX ORDER — AMLODIPINE BESYLATE AND BENAZEPRIL HYDROCHLORIDE 5; 10 MG/1; MG/1
1 CAPSULE ORAL DAILY
Status: DISCONTINUED | OUTPATIENT
Start: 2020-02-04 | End: 2020-02-03 | Stop reason: CLARIF

## 2020-02-03 RX ORDER — SODIUM CHLORIDE 0.9 % (FLUSH) 0.9 %
10 SYRINGE (ML) INJECTION EVERY 12 HOURS SCHEDULED
Status: DISCONTINUED | OUTPATIENT
Start: 2020-02-04 | End: 2020-02-10 | Stop reason: HOSPADM

## 2020-02-03 RX ADMIN — AZITHROMYCIN MONOHYDRATE 500 MG: 500 INJECTION, POWDER, LYOPHILIZED, FOR SOLUTION INTRAVENOUS at 19:11

## 2020-02-03 RX ADMIN — SODIUM CHLORIDE 1000 ML: 9 INJECTION, SOLUTION INTRAVENOUS at 17:45

## 2020-02-03 RX ADMIN — CEFTRIAXONE SODIUM 1 G: 1 INJECTION, POWDER, FOR SOLUTION INTRAMUSCULAR; INTRAVENOUS at 18:42

## 2020-02-03 RX ADMIN — OSELTAMIVIR PHOSPHATE 30 MG: 30 CAPSULE ORAL at 18:41

## 2020-02-03 RX ADMIN — IPRATROPIUM BROMIDE AND ALBUTEROL SULFATE 6 ML: .5; 3 SOLUTION RESPIRATORY (INHALATION) at 15:48

## 2020-02-03 RX ADMIN — METHYLPREDNISOLONE SODIUM SUCCINATE 125 MG: 125 INJECTION, POWDER, FOR SOLUTION INTRAMUSCULAR; INTRAVENOUS at 16:35

## 2020-02-03 RX ADMIN — MAGNESIUM SULFATE HEPTAHYDRATE 1 G: 1 INJECTION, SOLUTION INTRAVENOUS at 16:43

## 2020-02-03 RX ADMIN — MAGNESIUM SULFATE HEPTAHYDRATE 1 G: 1 INJECTION, SOLUTION INTRAVENOUS at 16:37

## 2020-02-03 ASSESSMENT — ENCOUNTER SYMPTOMS
SHORTNESS OF BREATH: 1
SORE THROAT: 0
ABDOMINAL PAIN: 0
NAUSEA: 0
COUGH: 1
VOMITING: 0
RHINORRHEA: 0
BACK PAIN: 0

## 2020-02-03 ASSESSMENT — PAIN SCALES - GENERAL: PAINLEVEL_OUTOF10: 0

## 2020-02-03 NOTE — PROGRESS NOTES
· Bronchodilator assessment       []   Continuous Aerosol    PEFR =     Bronchodilator assessment at level: 3  BRONCHODILATOR ASSESSMENT SCORE  Score 1 2 3 4   Breath Sounds   []  Clear [x]  Mild Wheezing with good aeration [x]  Moderate I/E wheezing with adequate aeration []  Poor Aeration or diffuse wheezing   Respiratory Rate []  Less than 20 [x]  20-25 []  Greater than 25  []  Greater than 35    Dyspnea []  No SOB  []  SOB with minimal activity [x]  Speaking in partial sentences []  Acute/ At rest   Peakflow (asthma) []  80 % or greater predicted/PB  []  Unable []  70% or greater predicted/PB  [x]  Unable []  51%-70% predicted/PB  [x]  Unable []  Less than 50% predicted/PB  []  Unable due to distress   FEV1 % Predicted []  Greater than 69%  []  Unable  []  Less than 50%-69%  [x]  Unable  []  Less than 35%-49%  [x]  Unable  []  Less than 35%  []  Unable due to distress

## 2020-02-03 NOTE — ED PROVIDER NOTES
[facility] ICU  Emergency Department Encounter  Emergency Medicine Resident     Pt Name: Angela Mcghee  MRN: [id number]  [place] 1945  Date ofevaluation: 2/3/20  PCP:  Brian Garcia MD    [address] [place] [address]       Chief Complaint   Patient presents with    Shortness of Breath     HISTORY OF PRESENT ILLNESS  (Location/Symptom, Timing/Onset, Context/Setting, Quality, Duration, Modifying Factors, Severity, Associated signs/symptoms)     Angela Mcghee is a 69 y.o. male who presents with acute onset of shortness of breath. Patient's history is somewhat limited as he is extremely hard of hearing and has to read lips. Most of history is gathered through his wife. She states that last several days he has been increasingly short of breath. She also notes a productive cough but states that it is \"not that bad \". But improved initially with he went to his primary care doctor's office today who directed him to the emergency department due to pursed lip breathing and low oxygen saturations. He was initially satting 75% on room air here nasal cannula oxygen. He does not wear oxygen at home. PAST MEDICAL / SURGICAL / SOCIAL / FAMILY HISTORY      has a past medical history of HUY (acute kidney injury) ([facility] [address] COPD (chronic obstructive pulmonary disease) ([facility] [address] History of shingles, and Hypertension. has no past surgical history on file.     Social History     Socioeconomic History    Marital status:      Spouse name: Not on file    Number of children: Not on file    Years of education: Not on file    Highest education level: Not on file   Occupational History    Not on file   Social Needs    Financial resource strain: Not on file    Food insecurity:     Worry: Not on file     Inability: Not on file    Transportation needs:     Medical: Not on file     Non-medical: Not on file   Tobacco Use    Smoking status: Former Smoker     Packs/day: 1.00     Years: 48.00     Pack years: 48.00     Start date: 1958     Last attempt to quit: 10/1/2006     Years since quittin.3    Smokeless tobacco: Never Used   Substance and Sexual Activity    Alcohol use: Yes     Alcohol/week: 0.0 standard drinks     Comment: occasional    Drug use: No    Sexual activity: Not on file   Lifestyle    Physical activity:     Days per week: Not on file     Minutes per session: Not on file    Stress: Not on file   Relationships    Social connections:     Talks on phone: Not on file     Gets together: Not on file     Attends Restorationist service: Not on file     Active member of club or organization: Not on file     Attends meetings of clubs or organizations: Not on file     Relationship status: Not on file    Intimate partner violence:     Fear of current or ex partner: Not on file     Emotionally abused: Not on file     Physically abused: Not on file     Forced sexual activity: Not on file   Other Topics Concern    Not on file   Social History Narrative    Not on file     Family History   Problem Relation Age of Onset    Cancer Mother 80        breast, lung    Heart Disease Father 78        heart attack     Allergies:  Patient has no known allergies. Home Medications:  Prior to Admission medications    Medication Sig Start Date End Date Taking?  Authorizing Provider   Omega-3 Fatty Acids (FISH OIL) 1000 MG CAPS Take 1,000 mg by mouth nightly   Yes Historical Provider, MD   amLODIPine-benazepril (LOTREL) 5-10 MG per capsule TAKE 1 CAPSULE BY MOUTH ONE TIME A DAY  1/3/20  Yes CHELLE Mralow CNP   budesonide-formoterol (SYMBICORT) 80-4.5 MCG/ACT AERO Inhale 2 puffs into the lungs 2 times daily 18  Yes Georgie Baptiste MD   aspirin EC 81 MG EC tablet Take 81 mg by mouth daily   Yes Historical Provider, MD   Cholecalciferol (VITAMIN D3) 2000 units CAPS Take 2,000 Units by mouth daily    Yes Historical Provider, MD   albuterol (PROVENTIL) (2.5 MG/3ML) 0.083% nebulizer solution Take 3 mLs by nebulization every 6 hours Procedures    Critical Care    MRSA DNA Probe, Nasal    Respiratory Virus PCR Panel    XR CHEST STANDARD (2 VW)    CBC Auto Differential    Comprehensive Metabolic Panel    Troponin    Brain Natriuretic Peptide    Blood Gas, Venous    Arterial Blood Gases    Diet NPO Effective Now Exceptions are: Ice Chips, Sips with Meds    Vital signs per unit routine    Notify physician    Notify physician    Up as tolerated    Place intermittent pneumatic compression device    Telemetry Monitoring    Full Code    Inpatient consult to Primary Care Provider    Inpatient consult to Pulmonology    Pharmacy to Dose: Rocephin    Pharmacy to Dose: zithromax    Initiate ED Aerosol Protocol    Respiratory care evaluation only    HHN Treatment    BIPAP    HHN Treatment    EKG 12 Lead    PATIENT STATUS (DIRECT) Inpatient    PATIENT STATUS (FROM ED OR OR/PROCEDURAL) Inpatient    PATIENT STATUS (FROM ED OR OR/PROCEDURAL) Inpatient       MEDICATIONS ORDERED:  Orders Placed This Encounter   Medications    ipratropium-albuterol (DUONEB) 0.5-2.5 (3) MG/3ML nebulizer solution     LAKIA GARCIA: cabinet override    methylPREDNISolone sodium (SOLU-MEDROL) injection 125 mg    magnesium sulfate 1 g in dextrose 5% 100 mL IVPB    albuterol (PROVENTIL) nebulizer solution 5 mg    DISCONTD: ipratropium-albuterol (DUONEB) nebulizer solution 1 ampule    DISCONTD: albuterol (PROVENTIL) nebulizer solution 5 mg    DISCONTD: albuterol sulfate  (90 Base) MCG/ACT inhaler 2 puff    DISCONTD: albuterol sulfate  (90 Base) MCG/ACT inhaler 2 puff    DISCONTD: ipratropium (ATROVENT HFA) 17 MCG/ACT inhaler 2 puff    DISCONTD: ipratropium (ATROVENT) 0.02 % nebulizer solution 0.5 mg    DISCONTD: ipratropium-albuterol (DUONEB) nebulizer solution 1 ampule    0.9 % sodium chloride bolus    cefTRIAXone (ROCEPHIN) 1 g IVPB in 50 mL D5W minibag    azithromycin (ZITHROMAX) 500 mg in D5W 250ml addavial    oseltamivir (TAMIFLU) capsule 30 mg    sodium chloride flush 0.9 % injection 10 mL    sodium chloride flush 0.9 % injection 10 mL    acetaminophen (TYLENOL) tablet 650 mg    enoxaparin (LOVENOX) injection 40 mg    ondansetron (ZOFRAN) injection 4 mg    albuterol (PROVENTIL) nebulizer solution 2.5 mg    aspirin EC tablet 81 mg    Vitamin D (CHOLECALCIFEROL) tablet 2,000 Units    mometasone-formoterol (DULERA) 100-5 MCG/ACT inhaler 2 puff    DISCONTD: tiotropium (SPIRIVA) inhalation capsule 18 mcg    DISCONTD: amLODIPine-benazepril (LOTREL) 5-10 MG per capsule 1 capsule     Order Specific Question:   Please select a reason the therapeutic interchange was not accepted:      Answer:   Zayra Vizcarra for Pharmacy to Substitute with Components    AND Linked Order Group     amLODIPine (NORVASC) tablet 5 mg     lisinopril (PRINIVIL;ZESTRIL) tablet 10 mg    tiotropium (SPIRIVA RESPIMAT) 2.5 MCG/ACT inhaler 2 puff    methylPREDNISolone sodium (SOLU-MEDROL) injection 60 mg    ipratropium-albuterol (DUONEB) nebulizer solution 1 ampule     DIAGNOSTIC RESULTS / EMERGENCYDEPARTMENT COURSE / MDM     LABS:  Results for orders placed or performed during the hospital encounter of 02/03/20   CBC Auto Differential   Result Value Ref Range    WBC 15.8 (H) 3.5 - 11.0 k/uL    RBC 4.66 4.5 - 5.9 m/uL    Hemoglobin 14.2 13.5 - 17.5 g/dL    Hematocrit 43.1 41 - 53 %    MCV 92.6 80 - 100 fL    MCH 30.6 26 - 34 pg    MCHC 33.0 31 - 37 g/dL    RDW 12.7 11.5 - 14.9 %    Platelets 315 056 - 978 k/uL    MPV 8.8 6.0 - 12.0 fL    NRBC Automated NOT REPORTED per 100 WBC    Differential Type NOT REPORTED     Immature Granulocytes NOT REPORTED 0 %    Absolute Immature Granulocyte NOT REPORTED 0.00 - 0.30 k/uL    WBC Morphology NOT REPORTED     RBC Morphology NOT REPORTED     Platelet Estimate NOT REPORTED     Seg Neutrophils 77 (H) 36 - 66 %    Lymphocytes 11 (L) 24 - 44 %    Monocytes 12 (H) 1 - 7 %    Eosinophils % 0 0 - 4 %    Basophils 0 0 - 2

## 2020-02-03 NOTE — ED PROVIDER NOTES
[address]  eMERGENCY dEPARTMENT eNCOUnter   Attending Attestation     Pt Name: Maritza Jimenez  MRN: [id number]  [place] 1945  Date of evaluation: 2/3/20    History, EXAM, MDM:    Maritza Jimenez is a 69 y.o. male who presents with Shortness of Breath  5 days worsening sob. Presented hypoxic. Poor air movement. Respiratory distress. Started on duoneb. Solumedrol. Required initiation of bipap after hypoxia persisting following about an hour of neb treatments. Settings 12/6 Fio2 40%. CXR shows multifocal pneumonia. Started on broad spectrum antibiotics. Tamiflu. Admitting to hospital. Repeat sepsis exam performed 5:45pm.  EKG shows a sinus tachycardia  HR is 114, , QRS 82, , no PARISH, No STD, No TWI, the axis is normal.  Consults to PCP and pulmonary. Critical Care  Performed by: Kirill Ratliff MD  Authorized by: Kirill Ratliff MD     Critical care provider statement:     Critical care time (minutes):  35    Critical care was necessary to treat or prevent imminent or life-threatening deterioration of the following conditions:  Respiratory failure    Critical care was time spent personally by me on the following activities:  Ordering and review of laboratory studies, ordering and review of radiographic studies, pulse oximetry, re-evaluation of patient's condition, ventilator management, discussions with consultants, discussions with primary provider, evaluation of patient's response to treatment and examination of patient              Vitals:   Vitals:    02/03/20 1642 02/03/20 1713 02/03/20 1715 02/03/20 1730   BP: 130/68  133/60 119/62   Pulse: 113  102 99   Resp: 20 19 21 22   Temp:       TempSrc:       SpO2: 92%  95% 93%   Weight:       Height:         I performed a history and physical examination of the patient and discussed management with the resident. I reviewed the residents note and agree with the documented findings and plan of care.  Any areas of

## 2020-02-03 NOTE — TELEPHONE ENCOUNTER
Reason for Disposition   MODERATE difficulty breathing (e.g., speaks in phrases, SOB even at rest, pulse 100-120) of new onset or worse than normal    Protocols used: BREATHING DIFFICULTY-ADULT-OH    Patient called [facility] pre-service center [facility]) to schedule appointment, with red flag complaint, transferred to RN access for triage. Patient states he feels like he cannot catch his breath even while resting. Wife denies any confusion or passing out. Wife states patient having history of dyspnea but has worsened recently. Patient reports symptoms as documented above. Patient informed of disposition. Care advice as documented. Wife declined to take patient to the ED per recommendation. Wife requesting to make an appointment with PCP as requested. Soft transfer to pre-service center to schedule appointment as recommended. Please do not respond to the triage nurse through this encounter. Any subsequent communication should be directly with the patient.

## 2020-02-04 PROBLEM — E66.9 OBESITY, CLASS I, BMI 30-34.9: Status: ACTIVE | Noted: 2020-02-04

## 2020-02-04 LAB
-: ABNORMAL
ABSOLUTE EOS #: 0 K/UL (ref 0–0.4)
ABSOLUTE IMMATURE GRANULOCYTE: ABNORMAL K/UL (ref 0–0.3)
ABSOLUTE LYMPH #: 0.6 K/UL (ref 1–4.8)
ABSOLUTE MONO #: 0.2 K/UL (ref 0.1–1.3)
ADENOVIRUS PCR: NOT DETECTED
ALLEN TEST: ABNORMAL
AMORPHOUS: ABNORMAL
ANION GAP SERPL CALCULATED.3IONS-SCNC: 13 MMOL/L (ref 9–17)
BACTERIA: ABNORMAL
BASOPHILS # BLD: 0 % (ref 0–2)
BASOPHILS ABSOLUTE: 0 K/UL (ref 0–0.2)
BILIRUBIN URINE: ABNORMAL
BORDETELLA PARAPERTUSSIS: NOT DETECTED
BORDETELLA PERTUSSIS PCR: NOT DETECTED
BUN BLDV-MCNC: 32 MG/DL (ref 8–23)
BUN/CREAT BLD: ABNORMAL (ref 9–20)
CALCIUM SERPL-MCNC: 8.7 MG/DL (ref 8.6–10.4)
CARBOXYHEMOGLOBIN: 33.3 % (ref 0–5)
CASTS UA: ABNORMAL /LPF
CASTS UA: ABNORMAL /LPF
CHLAMYDIA PNEUMONIAE BY PCR: NOT DETECTED
CHLORIDE BLD-SCNC: 99 MMOL/L (ref 98–107)
CO2: 22 MMOL/L (ref 20–31)
COLOR: ABNORMAL
COMMENT UA: ABNORMAL
CORONAVIRUS 229E PCR: NOT DETECTED
CORONAVIRUS HKU1 PCR: NOT DETECTED
CORONAVIRUS NL63 PCR: NOT DETECTED
CORONAVIRUS OC43 PCR: NOT DETECTED
CREAT SERPL-MCNC: 1.24 MG/DL (ref 0.7–1.2)
CRYSTALS, UA: ABNORMAL /HPF
DIFFERENTIAL TYPE: ABNORMAL
EKG ATRIAL RATE: 114 BPM
EKG P AXIS: 72 DEGREES
EKG P-R INTERVAL: 160 MS
EKG Q-T INTERVAL: 328 MS
EKG QRS DURATION: 82 MS
EKG QTC CALCULATION (BAZETT): 452 MS
EKG R AXIS: 33 DEGREES
EKG T AXIS: 64 DEGREES
EKG VENTRICULAR RATE: 114 BPM
EOSINOPHILS RELATIVE PERCENT: 0 % (ref 0–4)
EPITHELIAL CELLS UA: ABNORMAL /HPF
FIO2: 30
GFR AFRICAN AMERICAN: >60 ML/MIN
GFR NON-AFRICAN AMERICAN: 57 ML/MIN
GFR SERPL CREATININE-BSD FRML MDRD: ABNORMAL ML/MIN/{1.73_M2}
GFR SERPL CREATININE-BSD FRML MDRD: ABNORMAL ML/MIN/{1.73_M2}
GLUCOSE BLD-MCNC: 173 MG/DL (ref 70–99)
GLUCOSE URINE: NEGATIVE
HCO3 ARTERIAL: 23.7 MMOL/L (ref 22–26)
HCT VFR BLD CALC: 38.6 % (ref 41–53)
HEMOGLOBIN: 13.2 G/DL (ref 13.5–17.5)
HUMAN METAPNEUMOVIRUS PCR: NOT DETECTED
IMMATURE GRANULOCYTES: ABNORMAL %
INFLUENZA A BY PCR: NOT DETECTED
INFLUENZA A H1 (2009) PCR: NORMAL
INFLUENZA A H1 PCR: NORMAL
INFLUENZA A H3 PCR: NORMAL
INFLUENZA B BY PCR: NOT DETECTED
KETONES, URINE: ABNORMAL
LEUKOCYTE ESTERASE, URINE: NEGATIVE
LYMPHOCYTES # BLD: 7 % (ref 24–44)
MCH RBC QN AUTO: 31.6 PG (ref 26–34)
MCHC RBC AUTO-ENTMCNC: 34.1 G/DL (ref 31–37)
MCV RBC AUTO: 92.6 FL (ref 80–100)
METHEMOGLOBIN: 0.2 % (ref 0–1.9)
MODE: ABNORMAL
MONOCYTES # BLD: 2 % (ref 1–7)
MRSA, DNA, NASAL: NORMAL
MUCUS: ABNORMAL
MYCOPLASMA PNEUMONIAE PCR: NOT DETECTED
NEGATIVE BASE EXCESS, ART: 0.1 MMOL/L (ref 0–2)
NITRITE, URINE: NEGATIVE
NOTIFICATION TIME: ABNORMAL
NOTIFICATION: ABNORMAL
NRBC AUTOMATED: ABNORMAL PER 100 WBC
O2 DEVICE/FLOW/%: ABNORMAL
O2 SAT, ARTERIAL: 93.9 % (ref 95–98)
OTHER OBSERVATIONS UA: ABNORMAL
OXYHEMOGLOBIN: ABNORMAL % (ref 95–98)
PARAINFLUENZA 1 PCR: NOT DETECTED
PARAINFLUENZA 2 PCR: NOT DETECTED
PARAINFLUENZA 3 PCR: NOT DETECTED
PARAINFLUENZA 4 PCR: NOT DETECTED
PATIENT TEMP: 37
PCO2 ARTERIAL: 33.3 MMHG (ref 35–45)
PCO2, ART, TEMP ADJ: ABNORMAL (ref 35–45)
PDW BLD-RTO: 12.9 % (ref 11.5–14.9)
PEEP/CPAP: ABNORMAL
PH ARTERIAL: 7.46 (ref 7.35–7.45)
PH UA: 5 (ref 5–8)
PH, ART, TEMP ADJ: ABNORMAL (ref 7.35–7.45)
PLATELET # BLD: 272 K/UL (ref 150–450)
PLATELET ESTIMATE: ABNORMAL
PMV BLD AUTO: 8.6 FL (ref 6–12)
PO2 ARTERIAL: 68.6 MMHG (ref 80–100)
PO2, ART, TEMP ADJ: ABNORMAL MMHG (ref 80–100)
POSITIVE BASE EXCESS, ART: ABNORMAL MMOL/L (ref 0–2)
POTASSIUM SERPL-SCNC: 3.6 MMOL/L (ref 3.7–5.3)
PROTEIN UA: ABNORMAL
PSV: ABNORMAL
PT. POSITION: ABNORMAL
RBC # BLD: 4.17 M/UL (ref 4.5–5.9)
RBC # BLD: ABNORMAL 10*6/UL
RBC UA: ABNORMAL /HPF
RENAL EPITHELIAL, UA: ABNORMAL /HPF
RESP SYNCYTIAL VIRUS PCR: NOT DETECTED
RESPIRATORY RATE: 16
RHINO/ENTEROVIRUS PCR: NOT DETECTED
SAMPLE SITE: ABNORMAL
SEG NEUTROPHILS: 91 % (ref 36–66)
SEGMENTED NEUTROPHILS ABSOLUTE COUNT: 8.2 K/UL (ref 1.3–9.1)
SET RATE: 16
SODIUM BLD-SCNC: 134 MMOL/L (ref 135–144)
SPECIFIC GRAVITY UA: 1.02 (ref 1–1.03)
SPECIMEN DESCRIPTION: NORMAL
SPECIMEN DESCRIPTION: NORMAL
TEXT FOR RESPIRATORY: ABNORMAL
TOTAL HB: ABNORMAL G/DL (ref 12–16)
TOTAL RATE: 23
TRICHOMONAS: ABNORMAL
TURBIDITY: ABNORMAL
URINE HGB: ABNORMAL
UROBILINOGEN, URINE: NORMAL
VT: ABNORMAL
WBC # BLD: 9.1 K/UL (ref 3.5–11)
WBC # BLD: ABNORMAL 10*3/UL
WBC UA: ABNORMAL /HPF
YEAST: ABNORMAL

## 2020-02-04 PROCEDURE — 6370000000 HC RX 637 (ALT 250 FOR IP): Performed by: FAMILY MEDICINE

## 2020-02-04 PROCEDURE — 81001 URINALYSIS AUTO W/SCOPE: CPT

## 2020-02-04 PROCEDURE — 94640 AIRWAY INHALATION TREATMENT: CPT

## 2020-02-04 PROCEDURE — 2700000000 HC OXYGEN THERAPY PER DAY

## 2020-02-04 PROCEDURE — 94660 CPAP INITIATION&MGMT: CPT

## 2020-02-04 PROCEDURE — 6360000002 HC RX W HCPCS: Performed by: INTERNAL MEDICINE

## 2020-02-04 PROCEDURE — 2580000003 HC RX 258: Performed by: FAMILY MEDICINE

## 2020-02-04 PROCEDURE — 36415 COLL VENOUS BLD VENIPUNCTURE: CPT

## 2020-02-04 PROCEDURE — 0100U HC RESPIRPTHGN MULT REV TRANS & AMP PRB TECH 21 TRGT: CPT

## 2020-02-04 PROCEDURE — 85025 COMPLETE CBC W/AUTO DIFF WBC: CPT

## 2020-02-04 PROCEDURE — 6370000000 HC RX 637 (ALT 250 FOR IP): Performed by: INTERNAL MEDICINE

## 2020-02-04 PROCEDURE — 6360000002 HC RX W HCPCS: Performed by: STUDENT IN AN ORGANIZED HEALTH CARE EDUCATION/TRAINING PROGRAM

## 2020-02-04 PROCEDURE — 87641 MR-STAPH DNA AMP PROBE: CPT

## 2020-02-04 PROCEDURE — 36600 WITHDRAWAL OF ARTERIAL BLOOD: CPT

## 2020-02-04 PROCEDURE — 82805 BLOOD GASES W/O2 SATURATION: CPT

## 2020-02-04 PROCEDURE — 51702 INSERT TEMP BLADDER CATH: CPT

## 2020-02-04 PROCEDURE — 80048 BASIC METABOLIC PNL TOTAL CA: CPT

## 2020-02-04 PROCEDURE — 2580000003 HC RX 258: Performed by: INTERNAL MEDICINE

## 2020-02-04 PROCEDURE — 2060000000 HC ICU INTERMEDIATE R&B

## 2020-02-04 PROCEDURE — 99223 1ST HOSP IP/OBS HIGH 75: CPT | Performed by: FAMILY MEDICINE

## 2020-02-04 PROCEDURE — 94761 N-INVAS EAR/PLS OXIMETRY MLT: CPT

## 2020-02-04 RX ORDER — TAMSULOSIN HYDROCHLORIDE 0.4 MG/1
0.4 CAPSULE ORAL NIGHTLY
Status: DISCONTINUED | OUTPATIENT
Start: 2020-02-04 | End: 2020-02-10 | Stop reason: HOSPADM

## 2020-02-04 RX ORDER — TAMSULOSIN HYDROCHLORIDE 0.4 MG/1
0.4 CAPSULE ORAL DAILY
Status: DISCONTINUED | OUTPATIENT
Start: 2020-02-04 | End: 2020-02-04

## 2020-02-04 RX ORDER — METHYLPREDNISOLONE SODIUM SUCCINATE 125 MG/2ML
60 INJECTION, POWDER, LYOPHILIZED, FOR SOLUTION INTRAMUSCULAR; INTRAVENOUS EVERY 6 HOURS
Status: DISCONTINUED | OUTPATIENT
Start: 2020-02-04 | End: 2020-02-05

## 2020-02-04 RX ORDER — BUDESONIDE AND FORMOTEROL FUMARATE DIHYDRATE 80; 4.5 UG/1; UG/1
2 AEROSOL RESPIRATORY (INHALATION) 2 TIMES DAILY
Status: DISCONTINUED | OUTPATIENT
Start: 2020-02-04 | End: 2020-02-10 | Stop reason: HOSPADM

## 2020-02-04 RX ORDER — IPRATROPIUM BROMIDE AND ALBUTEROL SULFATE 2.5; .5 MG/3ML; MG/3ML
1 SOLUTION RESPIRATORY (INHALATION) EVERY 4 HOURS
Status: DISCONTINUED | OUTPATIENT
Start: 2020-02-04 | End: 2020-02-09

## 2020-02-04 RX ORDER — SODIUM CHLORIDE 9 MG/ML
INJECTION, SOLUTION INTRAVENOUS CONTINUOUS
Status: DISCONTINUED | OUTPATIENT
Start: 2020-02-04 | End: 2020-02-09

## 2020-02-04 RX ADMIN — ENOXAPARIN SODIUM 40 MG: 40 INJECTION SUBCUTANEOUS at 08:38

## 2020-02-04 RX ADMIN — AMLODIPINE BESYLATE 5 MG: 5 TABLET ORAL at 08:38

## 2020-02-04 RX ADMIN — IPRATROPIUM BROMIDE AND ALBUTEROL SULFATE 1 AMPULE: .5; 3 SOLUTION RESPIRATORY (INHALATION) at 08:16

## 2020-02-04 RX ADMIN — TAMSULOSIN HYDROCHLORIDE 0.4 MG: 0.4 CAPSULE ORAL at 21:37

## 2020-02-04 RX ADMIN — CEFTRIAXONE SODIUM 1 G: 1 INJECTION, POWDER, FOR SOLUTION INTRAMUSCULAR; INTRAVENOUS at 18:39

## 2020-02-04 RX ADMIN — METHYLPREDNISOLONE SODIUM SUCCINATE 60 MG: 125 INJECTION, POWDER, FOR SOLUTION INTRAMUSCULAR; INTRAVENOUS at 12:07

## 2020-02-04 RX ADMIN — IPRATROPIUM BROMIDE AND ALBUTEROL SULFATE 1 AMPULE: .5; 3 SOLUTION RESPIRATORY (INHALATION) at 04:21

## 2020-02-04 RX ADMIN — METHYLPREDNISOLONE SODIUM SUCCINATE 60 MG: 125 INJECTION, POWDER, FOR SOLUTION INTRAMUSCULAR; INTRAVENOUS at 05:45

## 2020-02-04 RX ADMIN — LISINOPRIL 10 MG: 10 TABLET ORAL at 08:38

## 2020-02-04 RX ADMIN — METHYLPREDNISOLONE SODIUM SUCCINATE 60 MG: 125 INJECTION, POWDER, FOR SOLUTION INTRAMUSCULAR; INTRAVENOUS at 18:39

## 2020-02-04 RX ADMIN — IPRATROPIUM BROMIDE AND ALBUTEROL SULFATE 1 AMPULE: .5; 3 SOLUTION RESPIRATORY (INHALATION) at 23:56

## 2020-02-04 RX ADMIN — BUDESONIDE AND FORMOTEROL FUMARATE DIHYDRATE 2 PUFF: 80; 4.5 AEROSOL RESPIRATORY (INHALATION) at 19:59

## 2020-02-04 RX ADMIN — METHYLPREDNISOLONE SODIUM SUCCINATE 60 MG: 125 INJECTION, POWDER, FOR SOLUTION INTRAMUSCULAR; INTRAVENOUS at 01:15

## 2020-02-04 RX ADMIN — MOMETASONE FUROATE AND FORMOTEROL FUMARATE DIHYDRATE 2 PUFF: 100; 5 AEROSOL RESPIRATORY (INHALATION) at 01:16

## 2020-02-04 RX ADMIN — IPRATROPIUM BROMIDE AND ALBUTEROL SULFATE 1 AMPULE: .5; 3 SOLUTION RESPIRATORY (INHALATION) at 11:12

## 2020-02-04 RX ADMIN — IPRATROPIUM BROMIDE AND ALBUTEROL SULFATE 1 AMPULE: .5; 3 SOLUTION RESPIRATORY (INHALATION) at 15:18

## 2020-02-04 RX ADMIN — SODIUM CHLORIDE: 9 INJECTION, SOLUTION INTRAVENOUS at 12:09

## 2020-02-04 RX ADMIN — VITAMIN D, TAB 1000IU (100/BT) 2000 UNITS: 25 TAB at 08:39

## 2020-02-04 RX ADMIN — IPRATROPIUM BROMIDE AND ALBUTEROL SULFATE 1 AMPULE: .5; 3 SOLUTION RESPIRATORY (INHALATION) at 19:41

## 2020-02-04 RX ADMIN — IPRATROPIUM BROMIDE AND ALBUTEROL SULFATE 1 AMPULE: .5; 3 SOLUTION RESPIRATORY (INHALATION) at 02:19

## 2020-02-04 RX ADMIN — TIOTROPIUM BROMIDE INHALATION SPRAY 2 PUFF: 3.12 SPRAY, METERED RESPIRATORY (INHALATION) at 08:47

## 2020-02-04 RX ADMIN — ASPIRIN 81 MG: 81 TABLET, COATED ORAL at 08:39

## 2020-02-04 RX ADMIN — SODIUM CHLORIDE: 9 INJECTION, SOLUTION INTRAVENOUS at 06:29

## 2020-02-04 RX ADMIN — AZITHROMYCIN MONOHYDRATE 500 MG: 500 INJECTION, POWDER, LYOPHILIZED, FOR SOLUTION INTRAVENOUS at 19:59

## 2020-02-04 RX ADMIN — MOMETASONE FUROATE AND FORMOTEROL FUMARATE DIHYDRATE 2 PUFF: 100; 5 AEROSOL RESPIRATORY (INHALATION) at 08:39

## 2020-02-04 ASSESSMENT — PAIN SCALES - GENERAL
PAINLEVEL_OUTOF10: 0
PAINLEVEL_OUTOF10: 0

## 2020-02-04 NOTE — PROGRESS NOTES
Pharmacy Note  Pharmacy Consult    Alaina Saxena is a 69 y.o. male ordered Rocephin and Zithromax for pneumonia; consult received from Dr. Chris Hoffman to manage therapy. Patient Active Problem List   Diagnosis    History of shingles    COPD (chronic obstructive pulmonary disease) ([facility])    HTN (hypertension)    Dyslipidemia    Hearing loss    HUY (acute kidney injury) ([facility]    Overweight (BMI 25.0-29. 9)    Fecal impaction in rectum (HCC)    Hypoxia       Allergies:  Patient has no known allergies. Temp max: 99.1    Recent Labs     02/03/20  1547   BUN 31*       Recent Labs     02/03/20  1547   CREATININE 1.53*       Recent Labs     02/03/20  1547   WBC 15.8*       No intake or output data in the 24 hours ending 02/04/20 0149    Culture Date      Source                       Results  See micro    Ht Readings from Last 1 Encounters:   02/03/20 5' 7\" (1.702 m)        Wt Readings from Last 1 Encounters:   02/03/20 200 lb (90.7 kg)         Estimated Creatinine Clearance: 46 mL/min (A) (based on SCr of 1.53 mg/dL (H)). Assessment/Plan:  Patient received rocephin 1 gram in ER at [address] on 2/4/2020 and Zithromax 500 mg iv at [address] [facility] on 2/4/2020. Start rocephin 1 gram iv every 24 hours and Zithromax 500 mg iv every 24 hours     Thank you for the consult. Pharmacy will continue to follow per policy.      Heather Ceballos RPh   2/4/2020   1:54 AM

## 2020-02-04 NOTE — CONSULTS
pleasant 67-year-old male. VITAL SIGNS:  Blood pressure 140/51, heart rate 95, respirations 27,  temperature max was 98.2, O2 saturations 90% on 3 liters. HEENT:  No supraclavicular lymph node enlargement. LUNGS:  I do hear some crackles at the bases. CARDIOVASCULAR:  Regular rhythm. ABDOMEN:  Soft. EXTREMITIES:  No edema. LABORATORY RESULTS:  BUN and creatinine 31/1.53 earlier, now 32/1. 24. White count elevated at 15.8 with 77 segs, 11 lymphs, 12 monocytes. This has improved to 9.1. Arterial blood gas, pH of 7.46, pCO2 of 33.3,  pO2 68. There is a respiratory viral pathogen negative. IMPRESSION:  Concern for multilobar community acquired pneumonia in a  patient with COPD and history of biapical scarring. The patient has a  long-term tobacco history quitting 14 years ago. PLAN:  1. Continue with Rocephin and Zithromax. 2.  DVT prophylaxis. 3.  DuoNeb treatments. 4.  Solu-Medrol 60 mg every 6 hours. He will hopefully be able to wean steroids  tomorrow. The patient appears to be strong enough for stepdown. He  appears better. Thank you Dr. Rosie Hughes for the consult.         Nieves Steward    D: 02/04/2020 11:41:02       T: 02/04/2020 12:36:36     [id number]  Job#: [id number]     Doc#: [id number]    CC:

## 2020-02-04 NOTE — PROGRESS NOTES
Patient has no urinated since being straight cathed at [facility] and has no urge. RN bladder scanned patient @ 9205 and it shows >415ml of urine. Dr. Beevrley Arriaga stated to place indwelling hardy catheter.  Electronically signed by Sergio Baca RN on 2/4/2020 at 2:12 PM

## 2020-02-04 NOTE — H&P
lesions. Neurologic: Alert and oriented X 3, neurovascularly intact with sensory/motor intact upper extremities/lower extremities, bilaterally. Cranial nerves: II-XII intact, grossly non-focal.  Mental status: Alert, oriented, thought content appropriate. CXR:  I have reviewed the CXR with the following interpretation: multifocal opacities  EKG:  I have reviewed the EKG with the following interpretation: sinus tachycardia    CBC   Recent Labs     02/03/20  1547 02/04/20  0431   WBC 15.8* 9.1   HGB 14.2 13.2*   HCT 43.1 38.6*    272      RENAL  Recent Labs     02/03/20  1547 02/04/20  0431   * 134*   K 4.2 3.6*   CL 95* 99   CO2 19* 22   BUN 31* 32*   CREATININE 1.53* 1.24*     LFT'S  Recent Labs     02/03/20  1547   AST 20   ALT 19   BILITOT 1.57*   ALKPHOS 65     COAG  No results for input(s): INR in the last 72 hours. CARDIAC ENZYMES  No results for input(s): CKTOTAL, CKMB, CKMBINDEX, TROPONINI in the last 72 hours. U/A:    Lab Results   Component Value Date    COLORU DARK YELLOW 02/12/2018    WBCUA 10 TO 20 02/12/2018    RBCUA 0 TO 2 02/12/2018    MUCUS 2+ 02/12/2018    BACTERIA FEW 02/12/2018    SPECGRAV 1.015 02/12/2018    LEUKOCYTESUR NEGATIVE 02/12/2018    GLUCOSEU NEGATIVE 02/12/2018    AMORPHOUS 2+ 02/12/2018       ABG  No results found for: RKP8HVR, BEART, F7VFPYJR, PHART, THGBART, GDO4NZH, PO2ART, DYB3IKJ        Active Hospital Problems    Diagnosis Date Noted    Obesity, Class I, BMI 30-34.9 [E66.9] 02/04/2020    Hypoxia [R09.02] 02/03/2020    Hearing loss [H91.90] 06/11/2014    Dyslipidemia [E78.5] 12/11/2013    HTN (hypertension) [I10] 12/11/2013    COPD (chronic obstructive pulmonary disease) ([facility] [address] [J44.9]          ASSESSMENT/PLAN:  Patient admitted with Hypoxia and probable pneumonia. Co-morbidities as above.     Orders Placed This Encounter   Procedures    Critical Care    MRSA DNA Probe, Nasal    Respiratory Virus PCR Panel    XR CHEST STANDARD (2 VW)    CBC Auto
Verbalized Understanding/Simple: Patient demonstrates quick and easy understanding

## 2020-02-04 NOTE — PROGRESS NOTES
Patient wheeled to 2092 via wheelchair and oxygen. Patient took all belongings and family aware of transfer. No distress is noted. Patient on NC3L.  Electronically signed by Scar Man RN on 2/4/2020 at 4:38 PM

## 2020-02-04 NOTE — PROGRESS NOTES
Pt admitted to ICU bed 2008 w/RT at bedside. Belongings placed in room cabinet. Telemetry initiated. Call light and bedside table within reach. Bed in lowest position. Bed wheels locked.

## 2020-02-04 NOTE — CARE COORDINATION
CASE MANAGEMENT NOTE:    Admission Date:  2/3/2020 Silvia Garcia is a 69 y.o.  male    Admitted for : Hypoxia [R09.02]  Hypoxia [R09.02]  Hypoxia [R09.02]    Met with:  Patient who is deaf    PCP:  Dr. Megha Busch:  David Camara:  independently at home             Current Services PTA:  No    Is patient agreeable to VNS: No    Freedom of choice provided:  NA    List of [address] provided: NA    VNS chosen:  No    DME:  none    Home Oxygen: No    Nebulizer: No    CPAP/BIPAP: No    Supplier: N/A    Potential Assistance Needed: No    SNF needed: No    Freedom of choice and list provided: No    Pharmacy:  [facility]       Does Patient want to use MEDS to BEDS? No    Is the Patient an [facility] with Readmission Risk Score greater than 14%? No  If yes, pt needs a follow up appointment made within 7 days. Family Members/Caregivers that pt would like involved in their care:    Yes    If yes, list name here:  Pat    Transportation Provider:  Family             Is patient in Isolation/One on One/Altered Mental Status? No  If yes, skip next question. If no, would they like an I-Pad to  use? No  If yes, call [phone]. Discharge Plan:  2/4/20 Adina Pt is from home with his family in a one story home pt is deaf states that he has 2 daughter in laws who are nurses and 2 grand-daughters who are nurses as well. Pt denies need for vns will continue to follow for needs on iv atb .//tv                   Electronically signed by:  Smita Bradley RN on 2/4/2020 at 11:47 AM

## 2020-02-04 NOTE — PLAN OF CARE
Problem: Falls - Risk of:  Goal: Will remain free from falls  Description  Will remain free from falls  2/4/2020 1438 by Paul Cornelius RN  Outcome: Met This Shift  Note:   Patient is up with steady strong gait. Patient wears NC3L and has IVF infusing. Patient calls for assistance appropriately. Pt assessed as a fall risk this shift. Remains free from falls and accidental injury at this time. Fall precautions in place, including falling star sign and fall risk band on pt. Floor free from obstacles, and bed is locked and in lowest position. Adequate lighting provided. Pt encouraged to call before getting OOB for any need. Will continue to monitor needs during hourly rounding, and reinforce education on use of call light. 2/4/2020 0301 by Sidra Moctezuma RN  Outcome: Ongoing  Goal: Absence of physical injury  Description  Absence of physical injury  2/4/2020 1438 by Paul Cornelius RN  Outcome: Met This Shift  2/4/2020 0301 by Sidra Moctezuma RN  Outcome: Ongoing     Problem: COMMUNICATION IMPAIRMENT  Goal: Ability to express needs and understand communication  2/4/2020 1438 by Paul Cornelius RN  Outcome: Met This Shift  Note:   Patient is alert and oriented and able to communicate appropriately. Patient is deaf but can read lips and read written communication. 2/4/2020 0301 by Sidra Moctezuma RN  Outcome: Ongoing     Problem: Airway Clearance - Ineffective:  Goal: Ability to maintain a clear airway will improve  Description  Ability to maintain a clear airway will improve  2/4/2020 1438 by Paul Cornelius RN  Outcome: Ongoing  Note:   Patient is able to cough and deep breathe appropriately. Patient wore BiPap over night and when RN came on shift, patient ate breakfast on NC4L and maintained O2 sat of >95%. RN turned patient's O2 down to NC3L and patient has maintaiend O2 sat 92-94%. Patient has diminished breath sounds.  Scheduled Duoneb given and patient on IV Solumedrol 60mgQ6  2/4/2020 0301 by Pedro Pablo Chávez RN  Outcome: Ongoing     Problem: Urinary Retention:  Goal: Urinary elimination within specified parameters  Description  Urinary elimination within specified parameters  Outcome: Ongoing  Note:   At beginning of shift, patient had not urinated. Night shift RN bladder scanned and straight cathetered the patient. After 6 hours patient still did not urinate, orders for indwelling urinary catheter placed. Catheter inserted per policy and urine returned. UA sent per protocol. Problem: Urinary Retention:  Goal: Ability to reestablish a normal urinary elimination pattern will improve - after catheter removal  Description  Ability to reestablish a normal urinary elimination pattern will improve - after catheter removal  Outcome: Not Met This Shift  Note:   Erazo catheter placed r/t urinary retention.  RN to reassess in the AM for removal.

## 2020-02-04 NOTE — FLOWSHEET NOTE
02/04/20 0022   Provider Notification   Reason for Communication New orders   Provider Name Dr. Ras Bowie   Provider Notification Physician   Method of Communication Call   Response See orders   Notification Time 0015   RN updated MD on admit to ICU and patient condition. MD ordered ABGs, CBC, BMP, Respiratory virus panel, MRSA swab, solumedrol, duoneb treatments, NPO exceptions are sips of water and oral medications, and pharmacy to dose zithromax and rocephin.

## 2020-02-04 NOTE — PROGRESS NOTES
Medication History completed:    New medications: fish oil    Medications discontinued: Spiriva (not covered by insurance)    Changes to dosing: none    Stated allergies: NKDA    Other pertinent information: Medications confirmed with [employer].      Thank you,  Diana Torres, PharmD, BCPS  [phone]

## 2020-02-05 LAB
ABSOLUTE EOS #: 0 K/UL (ref 0–0.4)
ABSOLUTE IMMATURE GRANULOCYTE: ABNORMAL K/UL (ref 0–0.3)
ABSOLUTE LYMPH #: 0.72 K/UL (ref 1–4.8)
ABSOLUTE MONO #: 0.29 K/UL (ref 0.1–1.3)
ANION GAP SERPL CALCULATED.3IONS-SCNC: 11 MMOL/L (ref 9–17)
BASOPHILS # BLD: 0 % (ref 0–2)
BASOPHILS ABSOLUTE: 0 K/UL (ref 0–0.2)
BUN BLDV-MCNC: 33 MG/DL (ref 8–23)
BUN/CREAT BLD: ABNORMAL (ref 9–20)
CALCIUM SERPL-MCNC: 8.5 MG/DL (ref 8.6–10.4)
CHLORIDE BLD-SCNC: 105 MMOL/L (ref 98–107)
CO2: 23 MMOL/L (ref 20–31)
CREAT SERPL-MCNC: 1.14 MG/DL (ref 0.7–1.2)
DIFFERENTIAL TYPE: ABNORMAL
EOSINOPHILS RELATIVE PERCENT: 0 % (ref 0–4)
GFR AFRICAN AMERICAN: >60 ML/MIN
GFR NON-AFRICAN AMERICAN: >60 ML/MIN
GFR SERPL CREATININE-BSD FRML MDRD: ABNORMAL ML/MIN/{1.73_M2}
GFR SERPL CREATININE-BSD FRML MDRD: ABNORMAL ML/MIN/{1.73_M2}
GLUCOSE BLD-MCNC: 214 MG/DL (ref 70–99)
HCT VFR BLD CALC: 37 % (ref 41–53)
HEMOGLOBIN: 12.5 G/DL (ref 13.5–17.5)
IMMATURE GRANULOCYTES: ABNORMAL %
LYMPHOCYTES # BLD: 5 % (ref 24–44)
MAGNESIUM: 2.9 MG/DL (ref 1.6–2.6)
MCH RBC QN AUTO: 31.5 PG (ref 26–34)
MCHC RBC AUTO-ENTMCNC: 33.9 G/DL (ref 31–37)
MCV RBC AUTO: 93.2 FL (ref 80–100)
MONOCYTES # BLD: 2 % (ref 1–7)
MORPHOLOGY: NORMAL
NRBC AUTOMATED: ABNORMAL PER 100 WBC
PDW BLD-RTO: 12.8 % (ref 11.5–14.9)
PLATELET # BLD: 311 K/UL (ref 150–450)
PLATELET ESTIMATE: ABNORMAL
PMV BLD AUTO: 8.2 FL (ref 6–12)
POTASSIUM SERPL-SCNC: 3.5 MMOL/L (ref 3.7–5.3)
RBC # BLD: 3.97 M/UL (ref 4.5–5.9)
RBC # BLD: ABNORMAL 10*6/UL
SEG NEUTROPHILS: 93 % (ref 36–66)
SEGMENTED NEUTROPHILS ABSOLUTE COUNT: 13.29 K/UL (ref 1.3–9.1)
SODIUM BLD-SCNC: 139 MMOL/L (ref 135–144)
WBC # BLD: 14.3 K/UL (ref 3.5–11)
WBC # BLD: ABNORMAL 10*3/UL

## 2020-02-05 PROCEDURE — 6360000002 HC RX W HCPCS: Performed by: STUDENT IN AN ORGANIZED HEALTH CARE EDUCATION/TRAINING PROGRAM

## 2020-02-05 PROCEDURE — 85025 COMPLETE CBC W/AUTO DIFF WBC: CPT

## 2020-02-05 PROCEDURE — 6370000000 HC RX 637 (ALT 250 FOR IP): Performed by: FAMILY MEDICINE

## 2020-02-05 PROCEDURE — 6360000002 HC RX W HCPCS: Performed by: INTERNAL MEDICINE

## 2020-02-05 PROCEDURE — 83735 ASSAY OF MAGNESIUM: CPT

## 2020-02-05 PROCEDURE — 99232 SBSQ HOSP IP/OBS MODERATE 35: CPT | Performed by: FAMILY MEDICINE

## 2020-02-05 PROCEDURE — 6370000000 HC RX 637 (ALT 250 FOR IP): Performed by: INTERNAL MEDICINE

## 2020-02-05 PROCEDURE — 36415 COLL VENOUS BLD VENIPUNCTURE: CPT

## 2020-02-05 PROCEDURE — 94640 AIRWAY INHALATION TREATMENT: CPT

## 2020-02-05 PROCEDURE — 94761 N-INVAS EAR/PLS OXIMETRY MLT: CPT

## 2020-02-05 PROCEDURE — 2580000003 HC RX 258: Performed by: INTERNAL MEDICINE

## 2020-02-05 PROCEDURE — 2580000003 HC RX 258: Performed by: STUDENT IN AN ORGANIZED HEALTH CARE EDUCATION/TRAINING PROGRAM

## 2020-02-05 PROCEDURE — 2700000000 HC OXYGEN THERAPY PER DAY

## 2020-02-05 PROCEDURE — 94660 CPAP INITIATION&MGMT: CPT

## 2020-02-05 PROCEDURE — 2580000003 HC RX 258: Performed by: FAMILY MEDICINE

## 2020-02-05 PROCEDURE — 2060000000 HC ICU INTERMEDIATE R&B

## 2020-02-05 PROCEDURE — 80048 BASIC METABOLIC PNL TOTAL CA: CPT

## 2020-02-05 RX ORDER — PANTOPRAZOLE SODIUM 40 MG/1
40 TABLET, DELAYED RELEASE ORAL
Status: COMPLETED | OUTPATIENT
Start: 2020-02-05 | End: 2020-02-05

## 2020-02-05 RX ORDER — POTASSIUM CHLORIDE 20 MEQ/1
40 TABLET, EXTENDED RELEASE ORAL PRN
Status: DISCONTINUED | OUTPATIENT
Start: 2020-02-05 | End: 2020-02-10 | Stop reason: HOSPADM

## 2020-02-05 RX ORDER — POTASSIUM CHLORIDE 7.45 MG/ML
10 INJECTION INTRAVENOUS PRN
Status: DISCONTINUED | OUTPATIENT
Start: 2020-02-05 | End: 2020-02-10 | Stop reason: HOSPADM

## 2020-02-05 RX ORDER — DOCUSATE SODIUM 100 MG/1
100 CAPSULE, LIQUID FILLED ORAL DAILY
Status: DISCONTINUED | OUTPATIENT
Start: 2020-02-05 | End: 2020-02-05

## 2020-02-05 RX ORDER — PANTOPRAZOLE SODIUM 40 MG/1
40 TABLET, DELAYED RELEASE ORAL
Status: DISCONTINUED | OUTPATIENT
Start: 2020-02-06 | End: 2020-02-10 | Stop reason: HOSPADM

## 2020-02-05 RX ORDER — DOCUSATE SODIUM 100 MG/1
100 CAPSULE, LIQUID FILLED ORAL DAILY
Status: DISCONTINUED | OUTPATIENT
Start: 2020-02-05 | End: 2020-02-10 | Stop reason: HOSPADM

## 2020-02-05 RX ORDER — METHYLPREDNISOLONE SODIUM SUCCINATE 40 MG/ML
40 INJECTION, POWDER, LYOPHILIZED, FOR SOLUTION INTRAMUSCULAR; INTRAVENOUS EVERY 6 HOURS
Status: DISCONTINUED | OUTPATIENT
Start: 2020-02-05 | End: 2020-02-06

## 2020-02-05 RX ORDER — MAGNESIUM HYDROXIDE/ALUMINUM HYDROXICE/SIMETHICONE 120; 1200; 1200 MG/30ML; MG/30ML; MG/30ML
30 SUSPENSION ORAL EVERY 6 HOURS PRN
Status: DISCONTINUED | OUTPATIENT
Start: 2020-02-05 | End: 2020-02-10 | Stop reason: HOSPADM

## 2020-02-05 RX ADMIN — ALUMINUM HYDROXIDE, MAGNESIUM HYDROXIDE, AND SIMETHICONE 30 ML: 200; 200; 20 SUSPENSION ORAL at 12:44

## 2020-02-05 RX ADMIN — PANTOPRAZOLE SODIUM 40 MG: 40 TABLET, DELAYED RELEASE ORAL at 12:11

## 2020-02-05 RX ADMIN — CEFTRIAXONE SODIUM 1 G: 1 INJECTION, POWDER, FOR SOLUTION INTRAMUSCULAR; INTRAVENOUS at 18:10

## 2020-02-05 RX ADMIN — DOCUSATE SODIUM 100 MG: 100 CAPSULE, LIQUID FILLED ORAL at 14:33

## 2020-02-05 RX ADMIN — VITAMIN D, TAB 1000IU (100/BT) 2000 UNITS: 25 TAB at 09:57

## 2020-02-05 RX ADMIN — SODIUM CHLORIDE: 9 INJECTION, SOLUTION INTRAVENOUS at 00:13

## 2020-02-05 RX ADMIN — ENOXAPARIN SODIUM 40 MG: 40 INJECTION SUBCUTANEOUS at 09:57

## 2020-02-05 RX ADMIN — IPRATROPIUM BROMIDE AND ALBUTEROL SULFATE 1 AMPULE: .5; 3 SOLUTION RESPIRATORY (INHALATION) at 11:22

## 2020-02-05 RX ADMIN — IPRATROPIUM BROMIDE AND ALBUTEROL SULFATE 1 AMPULE: .5; 3 SOLUTION RESPIRATORY (INHALATION) at 07:45

## 2020-02-05 RX ADMIN — IPRATROPIUM BROMIDE AND ALBUTEROL SULFATE 1 AMPULE: .5; 3 SOLUTION RESPIRATORY (INHALATION) at 04:18

## 2020-02-05 RX ADMIN — METHYLPREDNISOLONE SODIUM SUCCINATE 40 MG: 40 INJECTION, POWDER, FOR SOLUTION INTRAMUSCULAR; INTRAVENOUS at 18:09

## 2020-02-05 RX ADMIN — LISINOPRIL 10 MG: 10 TABLET ORAL at 09:57

## 2020-02-05 RX ADMIN — METHYLPREDNISOLONE SODIUM SUCCINATE 60 MG: 125 INJECTION, POWDER, FOR SOLUTION INTRAMUSCULAR; INTRAVENOUS at 00:53

## 2020-02-05 RX ADMIN — SODIUM CHLORIDE: 9 INJECTION, SOLUTION INTRAVENOUS at 10:12

## 2020-02-05 RX ADMIN — METHYLPREDNISOLONE SODIUM SUCCINATE 60 MG: 125 INJECTION, POWDER, FOR SOLUTION INTRAMUSCULAR; INTRAVENOUS at 12:04

## 2020-02-05 RX ADMIN — POTASSIUM CHLORIDE 40 MEQ: 1500 TABLET, EXTENDED RELEASE ORAL at 19:09

## 2020-02-05 RX ADMIN — Medication 10 ML: at 20:40

## 2020-02-05 RX ADMIN — IPRATROPIUM BROMIDE AND ALBUTEROL SULFATE 1 AMPULE: .5; 3 SOLUTION RESPIRATORY (INHALATION) at 19:51

## 2020-02-05 RX ADMIN — IPRATROPIUM BROMIDE AND ALBUTEROL SULFATE 1 AMPULE: .5; 3 SOLUTION RESPIRATORY (INHALATION) at 14:55

## 2020-02-05 RX ADMIN — ONDANSETRON 4 MG: 2 INJECTION INTRAMUSCULAR; INTRAVENOUS at 12:43

## 2020-02-05 RX ADMIN — AZITHROMYCIN MONOHYDRATE 500 MG: 500 INJECTION, POWDER, LYOPHILIZED, FOR SOLUTION INTRAVENOUS at 19:01

## 2020-02-05 RX ADMIN — METHYLPREDNISOLONE SODIUM SUCCINATE 60 MG: 125 INJECTION, POWDER, FOR SOLUTION INTRAMUSCULAR; INTRAVENOUS at 06:01

## 2020-02-05 RX ADMIN — TAMSULOSIN HYDROCHLORIDE 0.4 MG: 0.4 CAPSULE ORAL at 20:39

## 2020-02-05 RX ADMIN — BUDESONIDE AND FORMOTEROL FUMARATE DIHYDRATE 2 PUFF: 80; 4.5 AEROSOL RESPIRATORY (INHALATION) at 10:03

## 2020-02-05 RX ADMIN — ASPIRIN 81 MG: 81 TABLET, COATED ORAL at 09:57

## 2020-02-05 RX ADMIN — BUDESONIDE AND FORMOTEROL FUMARATE DIHYDRATE 2 PUFF: 80; 4.5 AEROSOL RESPIRATORY (INHALATION) at 20:39

## 2020-02-05 RX ADMIN — AMLODIPINE BESYLATE 5 MG: 5 TABLET ORAL at 09:57

## 2020-02-05 NOTE — PLAN OF CARE
Problem: Falls - Risk of:  Goal: Will remain free from falls  Description  Will remain free from falls  2/5/2020 0316 by Rosa Maria Flores RN  Outcome: Ongoing  Note:   No falls noted this shift. Patient ambulates with x1 staff assistance without difficulty. Bed kept in low position. Safe environment maintained. Bedside table & call light in reach. Uses call light appropriately when needing assistance. Problem: Airway Clearance - Ineffective:  Goal: Ability to maintain a clear airway will improve  Description  Ability to maintain a clear airway will improve  2/5/2020 0316 by Rosa Maria Flores RN  Outcome: Ongoing  Note:   Patient resp rate 18 breath/min; pulse ox 90-93% on 3L oxygen via nasal canula; lung sounds clear bilaterally; will continue to monitor for airway clearance. Problem: COMMUNICATION IMPAIRMENT  Goal: Ability to express needs and understand communication  2/5/2020 0316 by Rosa Maria Flores RN  Outcome: Ongoing  Note:   Pt able to verbalize needs; pt answers questions appropriately despite being deaf. Will continue to monitor communication impairment. Problem: Urinary Retention:  Goal: Urinary elimination within specified parameters  Description  Urinary elimination within specified parameters  2/5/2020 0316 by Rosa Maria Flores RN  Outcome: Ongoing  Note:   Erazo cath in place at this time; will continue to monitor for opportunities to offer urine elimination.

## 2020-02-05 NOTE — PROGRESS NOTES
[facility] provided bipap currently on standby in patient's room. Per patient, wore last night with no issues. Currently on 3lpm NC with SPO2 92%.

## 2020-02-05 NOTE — PROGRESS NOTES
Pulmonary Progress Note  Pulmonary and Critical Care Specialists      Patient - Alex Ferreira,  Age - 69 y.o.    - 1945      Room Number - -01   N -  [id number]   [facility]  Date of Admission -  2/3/2020  3:35 PM    Consulting Ever Whitehead MD  Primary Care Physician - Te Anderson MD     SUBJECTIVE   Patient appears to be doing fair. His major complaint was some abdominal discomfort. We have found out that he has not a bowel movement in 5 days. OBJECTIVE   VITALS    height is 5' 7\" (1.702 m) and weight is 201 lb 8 oz (91.4 kg). His oral temperature is 97.7 °F (36.5 °C). His blood pressure is 168/73 (abnormal) and his pulse is 103. His respiration is 20 and oxygen saturation is 90%. Body mass index is 31.56 kg/m². Temperature Range: Temp: 97.7 °F (36.5 °C) Temp  Av.7 °F (36.5 °C)  Min: 97.3 °F (36.3 °C)  Max: 98.2 °F (36.8 °C)  BP Range:  Systolic (13OYC), DZL:707 , Min:94 , KDD:536     Diastolic (84YSV), DEU:50, Min:41, Max:81    Pulse Range: Pulse  Av  Min: 85  Max: 104  Respiration Range: Resp  Av.3  Min: 16  Max: 29  Current Pulse Ox[de-identified]  SpO2: 90 %  24HR Pulse Ox Range:  SpO2  Av.6 %  Min: 84 %  Max: 96 %  Oxygen Amount and Delivery: O2 Flow Rate (L/min): 3 L/min    Wt Readings from Last 3 Encounters:   20 201 lb 8 oz (91.4 kg)   20 200 lb 12.8 oz (91.1 kg)   19 205 lb (93 kg)       I/O (24 Hours)    Intake/Output Summary (Last 24 hours) at 2020 1321  Last data filed at 2020 0556  Gross per 24 hour   Intake 2819 ml   Output 1150 ml   Net 1669 ml       EXAM     General Appearance  Awake, alert, oriented, in no acute distress  HEENT - normocephalic, atraumatic. Neck - Supple,  trachea midline   Lungs -some rhonchi some wheezes no rales airway movement but improved from yesterday  Heart Exam:PMI normal. No lifts, heaves, or thrills. RRR.  No murmurs, clicks, gallops, or rubs  Abdomen Exam: soft  Extremity Exam: No edema    MEDS      [START ON 2/6/2020] pantoprazole  40 mg Oral QAM AC    methylPREDNISolone  60 mg Intravenous Q6H    ipratropium-albuterol  1 ampule Inhalation Q4H    cefTRIAXone (ROCEPHIN) IV  1 g Intravenous Q24H    azithromycin  500 mg Intravenous Q24H    tamsulosin  0.4 mg Oral Nightly    budesonide-formoterol  2 puff Inhalation BID    sodium chloride flush  10 mL Intravenous 2 times per day    enoxaparin  40 mg Subcutaneous Daily    aspirin EC  81 mg Oral Daily    Vitamin D  2,000 Units Oral Daily    amLODIPine  5 mg Oral Daily    And    lisinopril  10 mg Oral Daily      sodium chloride 100 mL/hr at 02/05/20 1012     potassium chloride **OR** potassium alternative oral replacement **OR** potassium chloride, potassium chloride, aluminum & magnesium hydroxide-simethicone, sodium chloride flush, acetaminophen, ondansetron, albuterol    LABS   CBC   Recent Labs     02/05/20  0546   WBC 14.3*   HGB 12.5*   HCT 37.0*   MCV 93.2        BMP:   Lab Results   Component Value Date     02/05/2020    K 3.5 02/05/2020     02/05/2020    CO2 23 02/05/2020    BUN 33 02/05/2020    LABALBU 3.7 02/03/2020    CREATININE 1.14 02/05/2020    CALCIUM 8.5 02/05/2020    GFRAA >60 02/05/2020    LABGLOM >60 02/05/2020     ABGs:  Lab Results   Component Value Date    PHART 7.460 02/04/2020    PO2ART 68.6 02/04/2020    LGU6XUZ 33.3 02/04/2020      Lab Results   Component Value Date    MODE 12/6 30% O2 02/04/2020     Ionized Calcium:  No results found for: IONCA  Magnesium:    Lab Results   Component Value Date    MG 2.9 02/05/2020     Phosphorus:  No results found for: PHOS     LIVER PROFILE   Recent Labs     02/03/20  1547   AST 20   ALT 19   BILITOT 1.57*   ALKPHOS 65     INR No results for input(s): INR in the last 72 hours.   PTT No results found for: APTT      RADIOLOGY     (See actual reports for details)    ASSESSMENT/PLAN     Patient Active Problem List   Diagnosis    History of shingles    [facility])    HTN (hypertension)    Dyslipidemia    Hearing loss    [facility]Y [facility]acute [facility]) ([facility] [address]    Overweight (BMI 25.0-29. 9)    Fecal impaction in rectum (HCC)    Hypoxia    Obesity, Class I, BMI 30-34.9   Community acquired pneumonia  COPD exacerbation  Acute kidney injury    Continue with Rocephin and Zithromax   DVT prophylaxis  Decrease Solu-Medrol from 60 to 40 mmHg  Colace for a stool softener  Continue with DuoNeb treatments    Electronically signed by Cecilia Sanford MD on 2/5/2020 at 1:21 PM

## 2020-02-05 NOTE — CARE COORDINATION
DISCHARGE PLANNING NOTE:    Plan is for this patient to return to home. He declines need for VNS as he has 2 DIL and 2 granddaughters who are all RN's. We are following for home O2 as patient is currently 90% on 4L. On IV zithromax and IV rocephin, IV steroids 40Q6. Will continue to follow along.      Electronically signed by Golden Tobar RN on 2/5/2020 at 3:29 PM

## 2020-02-06 ENCOUNTER — APPOINTMENT (OUTPATIENT)
Dept: GENERAL RADIOLOGY | Age: 75
DRG: 190 | End: 2020-02-06
Payer: MEDICARE

## 2020-02-06 LAB
ABSOLUTE EOS #: 0 K/UL (ref 0–0.4)
ABSOLUTE IMMATURE GRANULOCYTE: ABNORMAL K/UL (ref 0–0.3)
ABSOLUTE LYMPH #: 0.62 K/UL (ref 1–4.8)
ABSOLUTE MONO #: 0.49 K/UL (ref 0.1–1.3)
ANION GAP SERPL CALCULATED.3IONS-SCNC: 9 MMOL/L (ref 9–17)
BASOPHILS # BLD: 0 % (ref 0–2)
BASOPHILS ABSOLUTE: 0 K/UL (ref 0–0.2)
BUN BLDV-MCNC: 35 MG/DL (ref 8–23)
BUN/CREAT BLD: ABNORMAL (ref 9–20)
CALCIUM SERPL-MCNC: 8.7 MG/DL (ref 8.6–10.4)
CHLORIDE BLD-SCNC: 106 MMOL/L (ref 98–107)
CO2: 22 MMOL/L (ref 20–31)
CREAT SERPL-MCNC: 1.03 MG/DL (ref 0.7–1.2)
DIFFERENTIAL TYPE: ABNORMAL
EOSINOPHILS RELATIVE PERCENT: 0 % (ref 0–4)
GFR AFRICAN AMERICAN: >60 ML/MIN
GFR NON-AFRICAN AMERICAN: >60 ML/MIN
GFR SERPL CREATININE-BSD FRML MDRD: ABNORMAL ML/MIN/{1.73_M2}
GFR SERPL CREATININE-BSD FRML MDRD: ABNORMAL ML/MIN/{1.73_M2}
GLUCOSE BLD-MCNC: 164 MG/DL (ref 70–99)
HCT VFR BLD CALC: 38.4 % (ref 41–53)
HEMOGLOBIN: 12.7 G/DL (ref 13.5–17.5)
IMMATURE GRANULOCYTES: ABNORMAL %
LYMPHOCYTES # BLD: 5 % (ref 24–44)
MCH RBC QN AUTO: 31 PG (ref 26–34)
MCHC RBC AUTO-ENTMCNC: 33 G/DL (ref 31–37)
MCV RBC AUTO: 93.7 FL (ref 80–100)
MONOCYTES # BLD: 4 % (ref 1–7)
MORPHOLOGY: NORMAL
NRBC AUTOMATED: ABNORMAL PER 100 WBC
PDW BLD-RTO: 12.9 % (ref 11.5–14.9)
PLATELET # BLD: 311 K/UL (ref 150–450)
PLATELET ESTIMATE: ABNORMAL
PMV BLD AUTO: 8.7 FL (ref 6–12)
POTASSIUM SERPL-SCNC: 5 MMOL/L (ref 3.7–5.3)
RBC # BLD: 4.1 M/UL (ref 4.5–5.9)
RBC # BLD: ABNORMAL 10*6/UL
SEG NEUTROPHILS: 91 % (ref 36–66)
SEGMENTED NEUTROPHILS ABSOLUTE COUNT: 11.19 K/UL (ref 1.3–9.1)
SODIUM BLD-SCNC: 137 MMOL/L (ref 135–144)
WBC # BLD: 12.3 K/UL (ref 3.5–11)
WBC # BLD: ABNORMAL 10*3/UL

## 2020-02-06 PROCEDURE — 6360000002 HC RX W HCPCS: Performed by: INTERNAL MEDICINE

## 2020-02-06 PROCEDURE — 71046 X-RAY EXAM CHEST 2 VIEWS: CPT

## 2020-02-06 PROCEDURE — 6360000002 HC RX W HCPCS: Performed by: STUDENT IN AN ORGANIZED HEALTH CARE EDUCATION/TRAINING PROGRAM

## 2020-02-06 PROCEDURE — 6370000000 HC RX 637 (ALT 250 FOR IP): Performed by: FAMILY MEDICINE

## 2020-02-06 PROCEDURE — 6370000000 HC RX 637 (ALT 250 FOR IP): Performed by: INTERNAL MEDICINE

## 2020-02-06 PROCEDURE — 94761 N-INVAS EAR/PLS OXIMETRY MLT: CPT

## 2020-02-06 PROCEDURE — 94640 AIRWAY INHALATION TREATMENT: CPT

## 2020-02-06 PROCEDURE — 80048 BASIC METABOLIC PNL TOTAL CA: CPT

## 2020-02-06 PROCEDURE — 94660 CPAP INITIATION&MGMT: CPT

## 2020-02-06 PROCEDURE — 85025 COMPLETE CBC W/AUTO DIFF WBC: CPT

## 2020-02-06 PROCEDURE — 2580000003 HC RX 258: Performed by: INTERNAL MEDICINE

## 2020-02-06 PROCEDURE — 2700000000 HC OXYGEN THERAPY PER DAY

## 2020-02-06 PROCEDURE — 99232 SBSQ HOSP IP/OBS MODERATE 35: CPT | Performed by: FAMILY MEDICINE

## 2020-02-06 PROCEDURE — 2060000000 HC ICU INTERMEDIATE R&B

## 2020-02-06 PROCEDURE — 2580000003 HC RX 258: Performed by: FAMILY MEDICINE

## 2020-02-06 PROCEDURE — 36415 COLL VENOUS BLD VENIPUNCTURE: CPT

## 2020-02-06 RX ORDER — LACTULOSE 10 G/15ML
20 SOLUTION ORAL 2 TIMES DAILY PRN
Status: DISCONTINUED | OUTPATIENT
Start: 2020-02-06 | End: 2020-02-10 | Stop reason: HOSPADM

## 2020-02-06 RX ORDER — METHYLPREDNISOLONE SODIUM SUCCINATE 40 MG/ML
30 INJECTION, POWDER, LYOPHILIZED, FOR SOLUTION INTRAMUSCULAR; INTRAVENOUS EVERY 12 HOURS
Status: DISCONTINUED | OUTPATIENT
Start: 2020-02-07 | End: 2020-02-08

## 2020-02-06 RX ORDER — METHYLPREDNISOLONE SODIUM SUCCINATE 40 MG/ML
30 INJECTION, POWDER, LYOPHILIZED, FOR SOLUTION INTRAMUSCULAR; INTRAVENOUS EVERY 8 HOURS
Status: DISCONTINUED | OUTPATIENT
Start: 2020-02-06 | End: 2020-02-06

## 2020-02-06 RX ADMIN — BUDESONIDE AND FORMOTEROL FUMARATE DIHYDRATE 2 PUFF: 80; 4.5 AEROSOL RESPIRATORY (INHALATION) at 19:30

## 2020-02-06 RX ADMIN — CEFTRIAXONE SODIUM 1 G: 1 INJECTION, POWDER, FOR SOLUTION INTRAMUSCULAR; INTRAVENOUS at 17:04

## 2020-02-06 RX ADMIN — AZITHROMYCIN MONOHYDRATE 500 MG: 500 INJECTION, POWDER, LYOPHILIZED, FOR SOLUTION INTRAVENOUS at 17:51

## 2020-02-06 RX ADMIN — LISINOPRIL 10 MG: 10 TABLET ORAL at 07:49

## 2020-02-06 RX ADMIN — METHYLPREDNISOLONE SODIUM SUCCINATE 40 MG: 40 INJECTION, POWDER, FOR SOLUTION INTRAMUSCULAR; INTRAVENOUS at 12:19

## 2020-02-06 RX ADMIN — ENOXAPARIN SODIUM 40 MG: 40 INJECTION SUBCUTANEOUS at 07:49

## 2020-02-06 RX ADMIN — METHYLPREDNISOLONE SODIUM SUCCINATE 40 MG: 40 INJECTION, POWDER, FOR SOLUTION INTRAMUSCULAR; INTRAVENOUS at 00:32

## 2020-02-06 RX ADMIN — IPRATROPIUM BROMIDE AND ALBUTEROL SULFATE 1 AMPULE: .5; 3 SOLUTION RESPIRATORY (INHALATION) at 00:49

## 2020-02-06 RX ADMIN — BUDESONIDE AND FORMOTEROL FUMARATE DIHYDRATE 2 PUFF: 80; 4.5 AEROSOL RESPIRATORY (INHALATION) at 08:46

## 2020-02-06 RX ADMIN — IPRATROPIUM BROMIDE AND ALBUTEROL SULFATE 1 AMPULE: .5; 3 SOLUTION RESPIRATORY (INHALATION) at 11:43

## 2020-02-06 RX ADMIN — SODIUM CHLORIDE: 9 INJECTION, SOLUTION INTRAVENOUS at 10:33

## 2020-02-06 RX ADMIN — METHYLPREDNISOLONE SODIUM SUCCINATE 40 MG: 40 INJECTION, POWDER, FOR SOLUTION INTRAMUSCULAR; INTRAVENOUS at 06:25

## 2020-02-06 RX ADMIN — IPRATROPIUM BROMIDE AND ALBUTEROL SULFATE 1 AMPULE: .5; 3 SOLUTION RESPIRATORY (INHALATION) at 19:45

## 2020-02-06 RX ADMIN — VITAMIN D, TAB 1000IU (100/BT) 2000 UNITS: 25 TAB at 07:49

## 2020-02-06 RX ADMIN — IPRATROPIUM BROMIDE AND ALBUTEROL SULFATE 1 AMPULE: .5; 3 SOLUTION RESPIRATORY (INHALATION) at 05:02

## 2020-02-06 RX ADMIN — DOCUSATE SODIUM 100 MG: 100 CAPSULE, LIQUID FILLED ORAL at 07:49

## 2020-02-06 RX ADMIN — IPRATROPIUM BROMIDE AND ALBUTEROL SULFATE 1 AMPULE: .5; 3 SOLUTION RESPIRATORY (INHALATION) at 15:59

## 2020-02-06 RX ADMIN — AMLODIPINE BESYLATE 5 MG: 5 TABLET ORAL at 07:49

## 2020-02-06 RX ADMIN — IPRATROPIUM BROMIDE AND ALBUTEROL SULFATE 1 AMPULE: .5; 3 SOLUTION RESPIRATORY (INHALATION) at 07:29

## 2020-02-06 RX ADMIN — TAMSULOSIN HYDROCHLORIDE 0.4 MG: 0.4 CAPSULE ORAL at 19:30

## 2020-02-06 RX ADMIN — ASPIRIN 81 MG: 81 TABLET, COATED ORAL at 07:49

## 2020-02-06 RX ADMIN — METHYLPREDNISOLONE SODIUM SUCCINATE 30 MG: 40 INJECTION, POWDER, FOR SOLUTION INTRAMUSCULAR; INTRAVENOUS at 23:30

## 2020-02-06 RX ADMIN — PANTOPRAZOLE SODIUM 40 MG: 40 TABLET, DELAYED RELEASE ORAL at 06:25

## 2020-02-06 RX ADMIN — LACTULOSE 20 G: 20 SOLUTION ORAL at 18:45

## 2020-02-06 ASSESSMENT — PAIN SCALES - GENERAL: PAINLEVEL_OUTOF10: 0

## 2020-02-06 NOTE — FLOWSHEET NOTE
02/06/20 1419   Oxygen Therapy   SpO2 95 %   Pulse Oximeter Device Mode Continuous   Pulse Oximeter Device Location Finger   O2 Device Nasal cannula   O2 Flow Rate (L/min) 4 L/min     TURNED OXYGEN DOWN TO 2.5L

## 2020-02-06 NOTE — PROGRESS NOTES
Bipap on standby at this time. Pulse Ox--91% 3lpm      Skin score--0     Nasal gel pad available at bedside. Pt awake/alert/no distress noted. Pt wore last night 10/6,35%.

## 2020-02-07 LAB
ABSOLUTE EOS #: 0 K/UL (ref 0–0.4)
ABSOLUTE IMMATURE GRANULOCYTE: ABNORMAL K/UL (ref 0–0.3)
ABSOLUTE LYMPH #: 0.9 K/UL (ref 1–4.8)
ABSOLUTE MONO #: 0.9 K/UL (ref 0.1–1.3)
ANION GAP SERPL CALCULATED.3IONS-SCNC: 11 MMOL/L (ref 9–17)
BASOPHILS # BLD: 0 % (ref 0–2)
BASOPHILS ABSOLUTE: 0 K/UL (ref 0–0.2)
BUN BLDV-MCNC: 33 MG/DL (ref 8–23)
BUN/CREAT BLD: ABNORMAL (ref 9–20)
CALCIUM SERPL-MCNC: 8.6 MG/DL (ref 8.6–10.4)
CHLORIDE BLD-SCNC: 108 MMOL/L (ref 98–107)
CO2: 21 MMOL/L (ref 20–31)
CREAT SERPL-MCNC: 1.04 MG/DL (ref 0.7–1.2)
DIFFERENTIAL TYPE: ABNORMAL
EOSINOPHILS RELATIVE PERCENT: 0 % (ref 0–4)
GFR AFRICAN AMERICAN: >60 ML/MIN
GFR NON-AFRICAN AMERICAN: >60 ML/MIN
GFR SERPL CREATININE-BSD FRML MDRD: ABNORMAL ML/MIN/{1.73_M2}
GFR SERPL CREATININE-BSD FRML MDRD: ABNORMAL ML/MIN/{1.73_M2}
GLUCOSE BLD-MCNC: 147 MG/DL (ref 70–99)
HCT VFR BLD CALC: 39.2 % (ref 41–53)
HEMOGLOBIN: 13.1 G/DL (ref 13.5–17.5)
IMMATURE GRANULOCYTES: ABNORMAL %
LYMPHOCYTES # BLD: 7 % (ref 24–44)
MCH RBC QN AUTO: 31.2 PG (ref 26–34)
MCHC RBC AUTO-ENTMCNC: 33.4 G/DL (ref 31–37)
MCV RBC AUTO: 93.3 FL (ref 80–100)
MONOCYTES # BLD: 8 % (ref 1–7)
NRBC AUTOMATED: ABNORMAL PER 100 WBC
PDW BLD-RTO: 13.1 % (ref 11.5–14.9)
PLATELET # BLD: 337 K/UL (ref 150–450)
PLATELET ESTIMATE: ABNORMAL
PMV BLD AUTO: 8.1 FL (ref 6–12)
POTASSIUM SERPL-SCNC: 4.6 MMOL/L (ref 3.7–5.3)
RBC # BLD: 4.21 M/UL (ref 4.5–5.9)
RBC # BLD: ABNORMAL 10*6/UL
SEG NEUTROPHILS: 85 % (ref 36–66)
SEGMENTED NEUTROPHILS ABSOLUTE COUNT: 10.6 K/UL (ref 1.3–9.1)
SODIUM BLD-SCNC: 140 MMOL/L (ref 135–144)
WBC # BLD: 12.5 K/UL (ref 3.5–11)
WBC # BLD: ABNORMAL 10*3/UL

## 2020-02-07 PROCEDURE — 2700000000 HC OXYGEN THERAPY PER DAY

## 2020-02-07 PROCEDURE — 2060000000 HC ICU INTERMEDIATE R&B

## 2020-02-07 PROCEDURE — 85025 COMPLETE CBC W/AUTO DIFF WBC: CPT

## 2020-02-07 PROCEDURE — 80048 BASIC METABOLIC PNL TOTAL CA: CPT

## 2020-02-07 PROCEDURE — 94660 CPAP INITIATION&MGMT: CPT

## 2020-02-07 PROCEDURE — 6360000002 HC RX W HCPCS: Performed by: INTERNAL MEDICINE

## 2020-02-07 PROCEDURE — 2580000003 HC RX 258: Performed by: FAMILY MEDICINE

## 2020-02-07 PROCEDURE — 6360000002 HC RX W HCPCS: Performed by: STUDENT IN AN ORGANIZED HEALTH CARE EDUCATION/TRAINING PROGRAM

## 2020-02-07 PROCEDURE — 36415 COLL VENOUS BLD VENIPUNCTURE: CPT

## 2020-02-07 PROCEDURE — 6370000000 HC RX 637 (ALT 250 FOR IP): Performed by: FAMILY MEDICINE

## 2020-02-07 PROCEDURE — 6370000000 HC RX 637 (ALT 250 FOR IP): Performed by: INTERNAL MEDICINE

## 2020-02-07 PROCEDURE — 2580000003 HC RX 258: Performed by: INTERNAL MEDICINE

## 2020-02-07 PROCEDURE — 94640 AIRWAY INHALATION TREATMENT: CPT

## 2020-02-07 PROCEDURE — 2580000003 HC RX 258: Performed by: STUDENT IN AN ORGANIZED HEALTH CARE EDUCATION/TRAINING PROGRAM

## 2020-02-07 PROCEDURE — 99232 SBSQ HOSP IP/OBS MODERATE 35: CPT | Performed by: FAMILY MEDICINE

## 2020-02-07 PROCEDURE — 94761 N-INVAS EAR/PLS OXIMETRY MLT: CPT

## 2020-02-07 RX ADMIN — IPRATROPIUM BROMIDE AND ALBUTEROL SULFATE 1 AMPULE: .5; 3 SOLUTION RESPIRATORY (INHALATION) at 00:34

## 2020-02-07 RX ADMIN — IPRATROPIUM BROMIDE AND ALBUTEROL SULFATE 1 AMPULE: .5; 3 SOLUTION RESPIRATORY (INHALATION) at 04:01

## 2020-02-07 RX ADMIN — IPRATROPIUM BROMIDE AND ALBUTEROL SULFATE 1 AMPULE: .5; 3 SOLUTION RESPIRATORY (INHALATION) at 23:30

## 2020-02-07 RX ADMIN — SODIUM CHLORIDE: 9 INJECTION, SOLUTION INTRAVENOUS at 21:04

## 2020-02-07 RX ADMIN — VITAMIN D, TAB 1000IU (100/BT) 2000 UNITS: 25 TAB at 08:08

## 2020-02-07 RX ADMIN — IPRATROPIUM BROMIDE AND ALBUTEROL SULFATE 1 AMPULE: .5; 3 SOLUTION RESPIRATORY (INHALATION) at 11:17

## 2020-02-07 RX ADMIN — AMLODIPINE BESYLATE 5 MG: 5 TABLET ORAL at 08:08

## 2020-02-07 RX ADMIN — PANTOPRAZOLE SODIUM 40 MG: 40 TABLET, DELAYED RELEASE ORAL at 05:33

## 2020-02-07 RX ADMIN — LISINOPRIL 10 MG: 10 TABLET ORAL at 08:09

## 2020-02-07 RX ADMIN — SODIUM CHLORIDE: 9 INJECTION, SOLUTION INTRAVENOUS at 08:12

## 2020-02-07 RX ADMIN — METHYLPREDNISOLONE SODIUM SUCCINATE 30 MG: 40 INJECTION, POWDER, FOR SOLUTION INTRAMUSCULAR; INTRAVENOUS at 12:08

## 2020-02-07 RX ADMIN — Medication 10 ML: at 12:08

## 2020-02-07 RX ADMIN — AZITHROMYCIN MONOHYDRATE 500 MG: 500 INJECTION, POWDER, LYOPHILIZED, FOR SOLUTION INTRAVENOUS at 17:12

## 2020-02-07 RX ADMIN — BUDESONIDE AND FORMOTEROL FUMARATE DIHYDRATE 2 PUFF: 80; 4.5 AEROSOL RESPIRATORY (INHALATION) at 08:09

## 2020-02-07 RX ADMIN — IPRATROPIUM BROMIDE AND ALBUTEROL SULFATE 1 AMPULE: .5; 3 SOLUTION RESPIRATORY (INHALATION) at 19:22

## 2020-02-07 RX ADMIN — IPRATROPIUM BROMIDE AND ALBUTEROL SULFATE 1 AMPULE: .5; 3 SOLUTION RESPIRATORY (INHALATION) at 08:26

## 2020-02-07 RX ADMIN — TAMSULOSIN HYDROCHLORIDE 0.4 MG: 0.4 CAPSULE ORAL at 21:53

## 2020-02-07 RX ADMIN — IPRATROPIUM BROMIDE AND ALBUTEROL SULFATE 1 AMPULE: .5; 3 SOLUTION RESPIRATORY (INHALATION) at 14:49

## 2020-02-07 RX ADMIN — ENOXAPARIN SODIUM 40 MG: 40 INJECTION SUBCUTANEOUS at 08:12

## 2020-02-07 RX ADMIN — DOCUSATE SODIUM 100 MG: 100 CAPSULE, LIQUID FILLED ORAL at 08:08

## 2020-02-07 RX ADMIN — CEFTRIAXONE SODIUM 1 G: 1 INJECTION, POWDER, FOR SOLUTION INTRAMUSCULAR; INTRAVENOUS at 16:22

## 2020-02-07 RX ADMIN — ASPIRIN 81 MG: 81 TABLET, COATED ORAL at 08:09

## 2020-02-07 NOTE — PROGRESS NOTES
DO   10 mL at 02/05/20 2040    sodium chloride flush 0.9 % injection 10 mL  10 mL Intravenous PRN Birda Coca, DO        acetaminophen (TYLENOL) tablet 650 mg  650 mg Oral Q4H PRN Birda Coca, DO        enoxaparin (LOVENOX) injection 40 mg  40 mg Subcutaneous Daily Birda Coca, DO   40 mg at 02/06/20 0749    ondansetron (ZOFRAN) injection 4 mg  4 mg Intravenous Q8H PRN Birda Coca, DO   4 mg at 02/05/20 1243    albuterol (PROVENTIL) nebulizer solution 2.5 mg  2.5 mg Nebulization Q6H PRN Donnie Elizabeth MD        aspirin EC tablet 81 mg  81 mg Oral Daily Donnie Elizabeth MD   81 mg at 02/06/20 7723    Vitamin D (CHOLECALCIFEROL) tablet 2,000 Units  2,000 Units Oral Daily Donnie Elizabeth MD   2,000 Units at 02/06/20 0749    amLODIPine (NORVASC) tablet 5 mg  5 mg Oral Daily Donnie Elizabeth MD   5 mg at 02/06/20 3773    And    lisinopril (PRINIVIL;ZESTRIL) tablet 10 mg  10 mg Oral Daily Donnie Elizabeth MD   10 mg at 02/06/20 0749       Intake/Output Summary (Last 24 hours) at 2/7/2020 0703  Last data filed at 2/7/2020 0500  Gross per 24 hour   Intake --   Output 1350 ml   Net -1350 ml     Recent Results (from the past 24 hour(s))   CBC with DIFF    Collection Time: 02/07/20  5:39 AM   Result Value Ref Range    WBC 12.5 (H) 3.5 - 11.0 k/uL    RBC 4.21 (L) 4.5 - 5.9 m/uL    Hemoglobin 13.1 (L) 13.5 - 17.5 g/dL    Hematocrit 39.2 (L) 41 - 53 %    MCV 93.3 80 - 100 fL    MCH 31.2 26 - 34 pg    MCHC 33.4 31 - 37 g/dL    RDW 13.1 11.5 - 14.9 %    Platelets 131 259 - 407 k/uL    MPV 8.1 6.0 - 12.0 fL    NRBC Automated NOT REPORTED per 100 WBC    Differential Type NOT REPORTED     Seg Neutrophils 85 (H) 36 - 66 %    Lymphocytes 7 (L) 24 - 44 %    Monocytes 8 (H) 1 - 7 %    Eosinophils % 0 0 - 4 %    Basophils 0 0 - 2 %    Immature Granulocytes NOT REPORTED 0 %    Segs Absolute 10.60 (H) 1.3 - 9.1 k/uL    Absolute Lymph # 0.90 (L) 1.0 - 4.8 k/uL    Absolute Mono # 0.90 0.1 - 1.3 k/uL    Absolute Eos # 0.00 0.0 - 0.4 k/uL    Basophils Absolute 0.00 0.0 - 0.2 k/uL    Absolute Immature Granulocyte NOT REPORTED 0.00 - 0.30 k/uL    WBC Morphology NOT REPORTED     RBC Morphology NOT REPORTED     Platelet Estimate NOT REPORTED    Basic Metabolic Panel w/ Reflex to MG    Collection Time: 02/07/20  5:39 AM   Result Value Ref Range    Glucose 147 (H) 70 - 99 mg/dL    BUN 33 (H) 8 - 23 mg/dL    CREATININE 1.04 0.70 - 1.20 mg/dL    Bun/Cre Ratio NOT REPORTED 9 - 20    Calcium 8.6 8.6 - 10.4 mg/dL    Sodium 140 135 - 144 mmol/L    Potassium 4.6 3.7 - 5.3 mmol/L    Chloride 108 (H) 98 - 107 mmol/L    CO2 21 20 - 31 mmol/L    Anion Gap 11 9 - 17 mmol/L    GFR Non-African American >60 >60 mL/min    GFR African American >60 >60 mL/min    GFR Comment          GFR Staging NOT REPORTED      -----------------------------------------------------------------  RAD:  EKG:  Micro:     Assessment & Plan:    Patient Active Problem List:     History of shingles     COPD exacerbation (HCC)     HTN (hypertension)     Dyslipidemia     Hearing loss     HUY (acute kidney injury) ([facility] [address]     Overweight (BMI 25.0-29. 9)     Fecal impaction in rectum (HCC)     Hypoxia     Obesity, Class I, BMI 30-34.9           Plan:  COPD exacerbation with hypoxia - improved, Pulmonology following. Continue current treatments. Hopefully discharge home 1-2 days. Complete orders per chart.     See orders   Disposition:    Electronically signed by Adelso Jordan MD on 2/7/2020 at 7:03 AM

## 2020-02-07 NOTE — PROGRESS NOTES
by the research participant? 1. What is the difference between maintenance and rescue inhalers (how they work)? Follow-up:  3. Research participant agrees to follow-up phone calls 2-3 days and 14-16 days post discharge:  Yes    Summary: This research participant reports having been diagnosed with COPD for 10+ years. This research participant received a total of 2 interventions during this encounter. On admission, the research participant had a CAT score of 22. The total time spent during this encounter was 30 minutes. All questions and concerns of the research participant have been addressed. Will continue to follow this research participant and their discharge date to plan for follow-up phone calls accordingly. Pharmacist phone number provided for further questions or concerns.

## 2020-02-08 LAB
ABSOLUTE EOS #: 0 K/UL (ref 0–0.4)
ABSOLUTE IMMATURE GRANULOCYTE: ABNORMAL K/UL (ref 0–0.3)
ABSOLUTE LYMPH #: 0.83 K/UL (ref 1–4.8)
ABSOLUTE MONO #: 0.6 K/UL (ref 0.1–1.3)
ANION GAP SERPL CALCULATED.3IONS-SCNC: 12 MMOL/L (ref 9–17)
BASOPHILS # BLD: 0 % (ref 0–2)
BASOPHILS ABSOLUTE: 0 K/UL (ref 0–0.2)
BUN BLDV-MCNC: 25 MG/DL (ref 8–23)
BUN/CREAT BLD: ABNORMAL (ref 9–20)
CALCIUM SERPL-MCNC: 8.6 MG/DL (ref 8.6–10.4)
CHLORIDE BLD-SCNC: 105 MMOL/L (ref 98–107)
CO2: 24 MMOL/L (ref 20–31)
CREAT SERPL-MCNC: 0.96 MG/DL (ref 0.7–1.2)
DIFFERENTIAL TYPE: ABNORMAL
EOSINOPHILS RELATIVE PERCENT: 0 % (ref 0–4)
GFR AFRICAN AMERICAN: >60 ML/MIN
GFR NON-AFRICAN AMERICAN: >60 ML/MIN
GFR SERPL CREATININE-BSD FRML MDRD: ABNORMAL ML/MIN/{1.73_M2}
GFR SERPL CREATININE-BSD FRML MDRD: ABNORMAL ML/MIN/{1.73_M2}
GLUCOSE BLD-MCNC: 128 MG/DL (ref 70–99)
HCT VFR BLD CALC: 39.7 % (ref 41–53)
HEMOGLOBIN: 13.1 G/DL (ref 13.5–17.5)
IMMATURE GRANULOCYTES: ABNORMAL %
LYMPHOCYTES # BLD: 7 % (ref 24–44)
MCH RBC QN AUTO: 30.9 PG (ref 26–34)
MCHC RBC AUTO-ENTMCNC: 33.1 G/DL (ref 31–37)
MCV RBC AUTO: 93.2 FL (ref 80–100)
MONOCYTES # BLD: 5 % (ref 1–7)
MORPHOLOGY: NORMAL
NRBC AUTOMATED: ABNORMAL PER 100 WBC
PDW BLD-RTO: 12.7 % (ref 11.5–14.9)
PLATELET # BLD: 326 K/UL (ref 150–450)
PLATELET ESTIMATE: ABNORMAL
PMV BLD AUTO: 8.5 FL (ref 6–12)
POTASSIUM SERPL-SCNC: 4.9 MMOL/L (ref 3.7–5.3)
RBC # BLD: 4.26 M/UL (ref 4.5–5.9)
RBC # BLD: ABNORMAL 10*6/UL
SEG NEUTROPHILS: 88 % (ref 36–66)
SEGMENTED NEUTROPHILS ABSOLUTE COUNT: 10.47 K/UL (ref 1.3–9.1)
SODIUM BLD-SCNC: 141 MMOL/L (ref 135–144)
WBC # BLD: 11.9 K/UL (ref 3.5–11)
WBC # BLD: ABNORMAL 10*3/UL

## 2020-02-08 PROCEDURE — 6360000002 HC RX W HCPCS: Performed by: INTERNAL MEDICINE

## 2020-02-08 PROCEDURE — 6370000000 HC RX 637 (ALT 250 FOR IP): Performed by: INTERNAL MEDICINE

## 2020-02-08 PROCEDURE — 80048 BASIC METABOLIC PNL TOTAL CA: CPT

## 2020-02-08 PROCEDURE — 6360000002 HC RX W HCPCS: Performed by: STUDENT IN AN ORGANIZED HEALTH CARE EDUCATION/TRAINING PROGRAM

## 2020-02-08 PROCEDURE — 36415 COLL VENOUS BLD VENIPUNCTURE: CPT

## 2020-02-08 PROCEDURE — 94660 CPAP INITIATION&MGMT: CPT

## 2020-02-08 PROCEDURE — 94640 AIRWAY INHALATION TREATMENT: CPT

## 2020-02-08 PROCEDURE — 6370000000 HC RX 637 (ALT 250 FOR IP): Performed by: FAMILY MEDICINE

## 2020-02-08 PROCEDURE — 2060000000 HC ICU INTERMEDIATE R&B

## 2020-02-08 PROCEDURE — 99232 SBSQ HOSP IP/OBS MODERATE 35: CPT | Performed by: FAMILY MEDICINE

## 2020-02-08 PROCEDURE — 2580000003 HC RX 258: Performed by: INTERNAL MEDICINE

## 2020-02-08 PROCEDURE — 85025 COMPLETE CBC W/AUTO DIFF WBC: CPT

## 2020-02-08 PROCEDURE — 94761 N-INVAS EAR/PLS OXIMETRY MLT: CPT

## 2020-02-08 PROCEDURE — 2700000000 HC OXYGEN THERAPY PER DAY

## 2020-02-08 RX ORDER — METHYLPREDNISOLONE SODIUM SUCCINATE 40 MG/ML
40 INJECTION, POWDER, LYOPHILIZED, FOR SOLUTION INTRAMUSCULAR; INTRAVENOUS EVERY 8 HOURS
Status: DISCONTINUED | OUTPATIENT
Start: 2020-02-08 | End: 2020-02-09

## 2020-02-08 RX ADMIN — TAMSULOSIN HYDROCHLORIDE 0.4 MG: 0.4 CAPSULE ORAL at 19:55

## 2020-02-08 RX ADMIN — ASPIRIN 81 MG: 81 TABLET, COATED ORAL at 08:53

## 2020-02-08 RX ADMIN — AMLODIPINE BESYLATE 5 MG: 5 TABLET ORAL at 08:53

## 2020-02-08 RX ADMIN — IPRATROPIUM BROMIDE AND ALBUTEROL SULFATE 1 AMPULE: .5; 3 SOLUTION RESPIRATORY (INHALATION) at 19:05

## 2020-02-08 RX ADMIN — IPRATROPIUM BROMIDE AND ALBUTEROL SULFATE 1 AMPULE: .5; 3 SOLUTION RESPIRATORY (INHALATION) at 07:03

## 2020-02-08 RX ADMIN — CEFTRIAXONE SODIUM 1 G: 1 INJECTION, POWDER, FOR SOLUTION INTRAMUSCULAR; INTRAVENOUS at 17:31

## 2020-02-08 RX ADMIN — IPRATROPIUM BROMIDE AND ALBUTEROL SULFATE 1 AMPULE: .5; 3 SOLUTION RESPIRATORY (INHALATION) at 16:02

## 2020-02-08 RX ADMIN — ENOXAPARIN SODIUM 40 MG: 40 INJECTION SUBCUTANEOUS at 08:52

## 2020-02-08 RX ADMIN — METHYLPREDNISOLONE SODIUM SUCCINATE 40 MG: 40 INJECTION, POWDER, FOR SOLUTION INTRAMUSCULAR; INTRAVENOUS at 17:31

## 2020-02-08 RX ADMIN — LISINOPRIL 10 MG: 10 TABLET ORAL at 08:53

## 2020-02-08 RX ADMIN — BUDESONIDE AND FORMOTEROL FUMARATE DIHYDRATE 2 PUFF: 80; 4.5 AEROSOL RESPIRATORY (INHALATION) at 19:55

## 2020-02-08 RX ADMIN — IPRATROPIUM BROMIDE AND ALBUTEROL SULFATE 1 AMPULE: .5; 3 SOLUTION RESPIRATORY (INHALATION) at 03:43

## 2020-02-08 RX ADMIN — BUDESONIDE AND FORMOTEROL FUMARATE DIHYDRATE 2 PUFF: 80; 4.5 AEROSOL RESPIRATORY (INHALATION) at 11:31

## 2020-02-08 RX ADMIN — VITAMIN D, TAB 1000IU (100/BT) 2000 UNITS: 25 TAB at 08:53

## 2020-02-08 RX ADMIN — DOCUSATE SODIUM 100 MG: 100 CAPSULE, LIQUID FILLED ORAL at 08:53

## 2020-02-08 RX ADMIN — IPRATROPIUM BROMIDE AND ALBUTEROL SULFATE 1 AMPULE: .5; 3 SOLUTION RESPIRATORY (INHALATION) at 11:55

## 2020-02-08 RX ADMIN — PANTOPRAZOLE SODIUM 40 MG: 40 TABLET, DELAYED RELEASE ORAL at 06:16

## 2020-02-08 RX ADMIN — METHYLPREDNISOLONE SODIUM SUCCINATE 40 MG: 40 INJECTION, POWDER, FOR SOLUTION INTRAMUSCULAR; INTRAVENOUS at 09:12

## 2020-02-08 RX ADMIN — METHYLPREDNISOLONE SODIUM SUCCINATE 30 MG: 40 INJECTION, POWDER, FOR SOLUTION INTRAMUSCULAR; INTRAVENOUS at 00:15

## 2020-02-08 NOTE — PROGRESS NOTES
Pt did not wear bipap overnight stated he doesn't like it and is unable to drink anything while on it. Pt currently on 1.5lnc spo2 91% increased oxygen to 2lnc.  Pt in no distress at this time

## 2020-02-08 NOTE — PROGRESS NOTES
FAMILY MEDICINE  - PROGRESS NOTE    Date:  2/8/2020  Mari Barajas  [id number]      Chief Complaint   Patient presents with    Shortness of Breath         Interval History:  Improved, he is sitting in the chair. He reports feeling better. He has no new complaints.       Subjective  Eyes: positive for contacts/glasses  Respiratory: positive for cough, emphysema, shortness of breath and wheezing  Musculoskeletal:positive for arthralgias and myalgias  Behavioral/Psych: positive for obesity:    Objective:    BP (!) 148/64   Pulse 66   Temp 97.5 °F (36.4 °C) (Oral)   Resp 16   Ht 5' 7\" (1.702 m)   Wt 201 lb 8 oz (91.4 kg)   SpO2 91%   BMI 31.56 kg/m²   General appearance - alert, well appearing, and in no distress and overweight  Mental status - alert, oriented to person, place, and time  Eyes - pupils equal and reactive, extraocular eye movements intact  Ears - bilateral TM's and external ear canals normal  Nose - normal and patent, no erythema, discharge or polyps  Mouth - mucous membranes moist, pharynx normal without lesions  Neck - supple, no significant adenopathy  Lymphatics - no palpable lymphadenopathy, no hepatosplenomegaly  Chest - clear to auscultation, no wheezes, rales or rhonchi, symmetric air entry, decreased air entry noted posteriorly  Heart - normal rate, regular rhythm, normal S1, S2, no murmurs, rubs, clicks or gallops  Abdomen - soft, nontender, nondistended, no masses or organomegaly  Breasts - not examined  Back exam - full range of motion, no tenderness, palpable spasm or pain on motion  Neurological - alert, oriented, normal speech, no focal findings or movement disorder noted  Musculoskeletal - osteoarthritic changes noted in both hands  Extremities - peripheral pulses normal, no pedal edema, no clubbing or cyanosis  Skin - normal coloration and turgor, no rashes, no suspicious skin lesions noted    Data:   Medications:   Current Facility-Administered Medications   Medication Dose Route Frequency Provider Last Rate Last Dose    methylPREDNISolone sodium (SOLU-MEDROL) injection 30 mg  30 mg Intravenous Q12H Te Johnson MD   30 mg at 02/08/20 0015    lactulose (CHRONULAC) 10 GM/15ML solution 20 g  20 g Oral BID PRN Nael Joshi MD   20 g at 02/06/20 1845    pantoprazole (PROTONIX) tablet 40 mg  40 mg Oral QAM AC Nael Joshi MD   40 mg at 02/08/20 0616    potassium chloride (KLOR-CON M) extended release tablet 40 mEq  40 mEq Oral PRN Nael Joshi MD   40 mEq at 02/05/20 1909    Or    potassium bicarb-citric acid (EFFER-K) effervescent tablet 40 mEq  40 mEq Oral PRN Nael Joshi MD        Or    potassium chloride 10 mEq/100 mL IVPB (Peripheral Line)  10 mEq Intravenous PRN Nael Joshi MD        potassium chloride 10 mEq/100 mL IVPB (Peripheral Line)  10 mEq Intravenous PRN Nael Joshi MD        aluminum & magnesium hydroxide-simethicone (MAALOX) 200-200-20 MG/5ML suspension 30 mL  30 mL Oral Q6H PRN Nael Joshi MD   30 mL at 02/05/20 1244    docusate sodium (COLACE) capsule 100 mg  100 mg Oral Daily Te Johnson MD   100 mg at 02/07/20 2802    ipratropium-albuterol (DUONEB) nebulizer solution 1 ampule  1 ampule Inhalation Q4H Gianfranco Cabrera MD   1 ampule at 02/08/20 0703    cefTRIAXone (ROCEPHIN) 1 g IVPB in 50 mL D5W minibag  1 g Intravenous Q24H Gianfranco Cabrera MD   Stopped at 02/07/20 1653    azithromycin (ZITHROMAX) 500 mg in D5W 250ml addavial  500 mg Intravenous Q24H Gianfranco Cabrera MD   Stopped at 02/07/20 1950    0.9 % sodium chloride infusion   Intravenous Continuous Nael Joshi  mL/hr at 02/07/20 2104      tamsulosin (FLOMAX) capsule 0.4 mg  0.4 mg Oral Nightly Nael Joshi MD   0.4 mg at 02/07/20 2153    budesonide-formoterol (SYMBICORT) 80-4.5 MCG/ACT inhaler 2 puff  2 puff Inhalation BID Nael Joshi MD   2 puff at 02/07/20 0809    sodium chloride flush 0.9 % injection 10 mL  10 mL Intravenous 2 times per day Wilhelmena Puller, DO   10 mL at 02/05/20 2040    sodium chloride flush 0.9 % injection 10 mL  10 mL Intravenous PRN [facility]   10 mL at 02/07/20 1208    acetaminophen (TYLENOL) tablet 650 mg  650 mg Oral Q4H PRN Wilhelmena Puller, DO        enoxaparin (LOVENOX) injection 40 mg  40 mg Subcutaneous Daily [facility]   40 mg at 02/07/20 4950    ondansetron (ZOFRAN) injection 4 mg  4 mg Intravenous Q8H PRN [facility]   4 mg at 02/05/20 1243    albuterol (PROVENTIL) nebulizer solution 2.5 mg  2.5 mg Nebulization Q6H PRN Georgie Baptiste MD        aspirin EC tablet 81 mg  81 mg Oral Daily Georgie Baptiste MD   81 mg at 02/07/20 0809    Vitamin D (CHOLECALCIFEROL) tablet 2,000 Units  2,000 Units Oral Daily Georgie Baptiste MD   2,000 Units at 02/07/20 2564    amLODIPine (NORVASC) tablet 5 mg  5 mg Oral Daily Georgie Baptiste MD   5 mg at 02/07/20 6984    And    lisinopril (PRINIVIL;ZESTRIL) tablet 10 mg  10 mg Oral Daily Georgie Baptiste MD   10 mg at 02/07/20 0809       Intake/Output Summary (Last 24 hours) at 2/8/2020 0736  Last data filed at 2/7/2020 1724  Gross per 24 hour   Intake 1910 ml   Output 850 ml   Net 1060 ml     Recent Results (from the past 24 hour(s))   CBC with DIFF    Collection Time: 02/08/20  5:18 AM   Result Value Ref Range    WBC 11.9 (H) 3.5 - 11.0 k/uL    RBC 4.26 (L) 4.5 - 5.9 m/uL    Hemoglobin 13.1 (L) 13.5 - 17.5 g/dL    Hematocrit 39.7 (L) 41 - 53 %    MCV 93.2 80 - 100 fL    MCH 30.9 26 - 34 pg    MCHC 33.1 31 - 37 g/dL    RDW 12.7 11.5 - 14.9 %    Platelets 483 567 - 421 k/uL    MPV 8.5 6.0 - 12.0 fL    NRBC Automated NOT REPORTED per 100 WBC    Differential Type NOT REPORTED     Seg Neutrophils PENDING %    Lymphocytes PENDING %    Monocytes PENDING %    Eosinophils % PENDING %    Basophils PENDING %    Immature Granulocytes NOT REPORTED 0 %    Segs Absolute PENDING k/uL    Absolute Lymph #

## 2020-02-08 NOTE — CARE COORDINATION
ONGOING DISCHARGE PLAN:    Discharge plan for the patient is home with family (multiple family members are nurses). VNS has been declined. Will follow for home respiratory needs. Amy Waldrop following. Per flow sheet patient currently on 2L NC. Active orders for IV rocephin and 40mg Q8 hours solu-medrol. Will continue to follow for additional discharge needs.     Electronically signed by Juan Pablo Slater RN on 2/8/2020 at 10:29 AM

## 2020-02-09 PROBLEM — J18.9 MULTIFOCAL PNEUMONIA: Status: ACTIVE | Noted: 2020-02-09

## 2020-02-09 LAB
ABSOLUTE EOS #: 0 K/UL (ref 0–0.4)
ABSOLUTE IMMATURE GRANULOCYTE: ABNORMAL K/UL (ref 0–0.3)
ABSOLUTE LYMPH #: 0.89 K/UL (ref 1–4.8)
ABSOLUTE MONO #: 0.25 K/UL (ref 0.1–1.3)
ANION GAP SERPL CALCULATED.3IONS-SCNC: 10 MMOL/L (ref 9–17)
BASOPHILS # BLD: 0 % (ref 0–2)
BASOPHILS ABSOLUTE: 0 K/UL (ref 0–0.2)
BUN BLDV-MCNC: 26 MG/DL (ref 8–23)
BUN/CREAT BLD: ABNORMAL (ref 9–20)
CALCIUM SERPL-MCNC: 8.9 MG/DL (ref 8.6–10.4)
CHLORIDE BLD-SCNC: 105 MMOL/L (ref 98–107)
CO2: 24 MMOL/L (ref 20–31)
CREAT SERPL-MCNC: 1.06 MG/DL (ref 0.7–1.2)
DIFFERENTIAL TYPE: ABNORMAL
EOSINOPHILS RELATIVE PERCENT: 0 % (ref 0–4)
GFR AFRICAN AMERICAN: >60 ML/MIN
GFR NON-AFRICAN AMERICAN: >60 ML/MIN
GFR SERPL CREATININE-BSD FRML MDRD: ABNORMAL ML/MIN/{1.73_M2}
GFR SERPL CREATININE-BSD FRML MDRD: ABNORMAL ML/MIN/{1.73_M2}
GLUCOSE BLD-MCNC: 156 MG/DL (ref 70–99)
HCT VFR BLD CALC: 40.9 % (ref 41–53)
HEMOGLOBIN: 13.7 G/DL (ref 13.5–17.5)
IMMATURE GRANULOCYTES: ABNORMAL %
LYMPHOCYTES # BLD: 7 % (ref 24–44)
MCH RBC QN AUTO: 31 PG (ref 26–34)
MCHC RBC AUTO-ENTMCNC: 33.4 G/DL (ref 31–37)
MCV RBC AUTO: 92.7 FL (ref 80–100)
MONOCYTES # BLD: 2 % (ref 1–7)
MORPHOLOGY: NORMAL
NRBC AUTOMATED: ABNORMAL PER 100 WBC
PDW BLD-RTO: 12.6 % (ref 11.5–14.9)
PLATELET # BLD: 342 K/UL (ref 150–450)
PLATELET ESTIMATE: ABNORMAL
PMV BLD AUTO: 8.3 FL (ref 6–12)
POTASSIUM SERPL-SCNC: 5.1 MMOL/L (ref 3.7–5.3)
RBC # BLD: 4.41 M/UL (ref 4.5–5.9)
RBC # BLD: ABNORMAL 10*6/UL
SEG NEUTROPHILS: 91 % (ref 36–66)
SEGMENTED NEUTROPHILS ABSOLUTE COUNT: 11.56 K/UL (ref 1.3–9.1)
SODIUM BLD-SCNC: 139 MMOL/L (ref 135–144)
WBC # BLD: 12.7 K/UL (ref 3.5–11)
WBC # BLD: ABNORMAL 10*3/UL

## 2020-02-09 PROCEDURE — 6360000002 HC RX W HCPCS: Performed by: INTERNAL MEDICINE

## 2020-02-09 PROCEDURE — 6370000000 HC RX 637 (ALT 250 FOR IP): Performed by: FAMILY MEDICINE

## 2020-02-09 PROCEDURE — 99232 SBSQ HOSP IP/OBS MODERATE 35: CPT | Performed by: FAMILY MEDICINE

## 2020-02-09 PROCEDURE — 2580000003 HC RX 258: Performed by: INTERNAL MEDICINE

## 2020-02-09 PROCEDURE — 94760 N-INVAS EAR/PLS OXIMETRY 1: CPT

## 2020-02-09 PROCEDURE — 2060000000 HC ICU INTERMEDIATE R&B

## 2020-02-09 PROCEDURE — 6370000000 HC RX 637 (ALT 250 FOR IP): Performed by: INTERNAL MEDICINE

## 2020-02-09 PROCEDURE — 80048 BASIC METABOLIC PNL TOTAL CA: CPT

## 2020-02-09 PROCEDURE — 94640 AIRWAY INHALATION TREATMENT: CPT

## 2020-02-09 PROCEDURE — 2700000000 HC OXYGEN THERAPY PER DAY

## 2020-02-09 PROCEDURE — 36415 COLL VENOUS BLD VENIPUNCTURE: CPT

## 2020-02-09 PROCEDURE — 2580000003 HC RX 258: Performed by: STUDENT IN AN ORGANIZED HEALTH CARE EDUCATION/TRAINING PROGRAM

## 2020-02-09 PROCEDURE — 85025 COMPLETE CBC W/AUTO DIFF WBC: CPT

## 2020-02-09 PROCEDURE — 6360000002 HC RX W HCPCS: Performed by: STUDENT IN AN ORGANIZED HEALTH CARE EDUCATION/TRAINING PROGRAM

## 2020-02-09 RX ORDER — IPRATROPIUM BROMIDE AND ALBUTEROL SULFATE 2.5; .5 MG/3ML; MG/3ML
1 SOLUTION RESPIRATORY (INHALATION) 4 TIMES DAILY
Status: DISCONTINUED | OUTPATIENT
Start: 2020-02-09 | End: 2020-02-10 | Stop reason: HOSPADM

## 2020-02-09 RX ORDER — METHYLPREDNISOLONE SODIUM SUCCINATE 40 MG/ML
40 INJECTION, POWDER, LYOPHILIZED, FOR SOLUTION INTRAMUSCULAR; INTRAVENOUS EVERY 12 HOURS
Status: DISCONTINUED | OUTPATIENT
Start: 2020-02-09 | End: 2020-02-10 | Stop reason: HOSPADM

## 2020-02-09 RX ADMIN — CEFTRIAXONE SODIUM 1 G: 1 INJECTION, POWDER, FOR SOLUTION INTRAMUSCULAR; INTRAVENOUS at 18:48

## 2020-02-09 RX ADMIN — LISINOPRIL 10 MG: 10 TABLET ORAL at 08:35

## 2020-02-09 RX ADMIN — TAMSULOSIN HYDROCHLORIDE 0.4 MG: 0.4 CAPSULE ORAL at 20:43

## 2020-02-09 RX ADMIN — DOCUSATE SODIUM 100 MG: 100 CAPSULE, LIQUID FILLED ORAL at 08:35

## 2020-02-09 RX ADMIN — METHYLPREDNISOLONE SODIUM SUCCINATE 40 MG: 40 INJECTION, POWDER, FOR SOLUTION INTRAMUSCULAR; INTRAVENOUS at 20:43

## 2020-02-09 RX ADMIN — ASPIRIN 81 MG: 81 TABLET, COATED ORAL at 08:37

## 2020-02-09 RX ADMIN — BUDESONIDE AND FORMOTEROL FUMARATE DIHYDRATE 2 PUFF: 80; 4.5 AEROSOL RESPIRATORY (INHALATION) at 08:35

## 2020-02-09 RX ADMIN — BUDESONIDE AND FORMOTEROL FUMARATE DIHYDRATE 2 PUFF: 80; 4.5 AEROSOL RESPIRATORY (INHALATION) at 20:43

## 2020-02-09 RX ADMIN — IPRATROPIUM BROMIDE AND ALBUTEROL SULFATE 1 AMPULE: .5; 3 SOLUTION RESPIRATORY (INHALATION) at 15:00

## 2020-02-09 RX ADMIN — Medication 10 ML: at 08:37

## 2020-02-09 RX ADMIN — IPRATROPIUM BROMIDE AND ALBUTEROL SULFATE 1 AMPULE: .5; 3 SOLUTION RESPIRATORY (INHALATION) at 20:34

## 2020-02-09 RX ADMIN — METHYLPREDNISOLONE SODIUM SUCCINATE 40 MG: 40 INJECTION, POWDER, FOR SOLUTION INTRAMUSCULAR; INTRAVENOUS at 08:35

## 2020-02-09 RX ADMIN — IPRATROPIUM BROMIDE AND ALBUTEROL SULFATE 1 AMPULE: .5; 3 SOLUTION RESPIRATORY (INHALATION) at 01:08

## 2020-02-09 RX ADMIN — ENOXAPARIN SODIUM 40 MG: 40 INJECTION SUBCUTANEOUS at 08:35

## 2020-02-09 RX ADMIN — PANTOPRAZOLE SODIUM 40 MG: 40 TABLET, DELAYED RELEASE ORAL at 06:19

## 2020-02-09 RX ADMIN — IPRATROPIUM BROMIDE AND ALBUTEROL SULFATE 1 AMPULE: .5; 3 SOLUTION RESPIRATORY (INHALATION) at 03:59

## 2020-02-09 RX ADMIN — VITAMIN D, TAB 1000IU (100/BT) 2000 UNITS: 25 TAB at 08:35

## 2020-02-09 RX ADMIN — AMLODIPINE BESYLATE 5 MG: 5 TABLET ORAL at 08:35

## 2020-02-09 RX ADMIN — IPRATROPIUM BROMIDE AND ALBUTEROL SULFATE 1 AMPULE: .5; 3 SOLUTION RESPIRATORY (INHALATION) at 08:13

## 2020-02-09 RX ADMIN — METHYLPREDNISOLONE SODIUM SUCCINATE 40 MG: 40 INJECTION, POWDER, FOR SOLUTION INTRAMUSCULAR; INTRAVENOUS at 00:55

## 2020-02-09 NOTE — PLAN OF CARE
Problem: Falls - Risk of:  Goal: Will remain free from falls  Description  Will remain free from falls  Outcome: Ongoing  Note:   Pt has remained free from falls this shift. Call light within reach. Side rails up X2. Bed in lowest position. Pt safety maintained. Goal: Absence of physical injury  Description  Absence of physical injury  Outcome: Ongoing  Note:   No injury this shift. Pt safety maintained.       Problem: Airway Clearance - Ineffective:  Goal: Ability to maintain a clear airway will improve  Description  Ability to maintain a clear airway will improve  Outcome: Ongoing     Problem: COMMUNICATION IMPAIRMENT  Goal: Ability to express needs and understand communication  Outcome: Ongoing     Problem: Urinary Retention:  Goal: Urinary elimination within specified parameters  Description  Urinary elimination within specified parameters  Outcome: Ongoing  Goal: Ability to reestablish a normal urinary elimination pattern will improve - after catheter removal  Description  Ability to reestablish a normal urinary elimination pattern will improve - after catheter removal  Outcome: Ongoing

## 2020-02-09 NOTE — PROGRESS NOTES
Pulmonary Progress Note  [facility]      Patient - Edvin Zavaleta,  Age - 69 y.o.    - 1945      Room Number - 2092/-01   N -  [id number]   [facility]  Date of Admission -  2/3/2020  3:35 PM        Consulting Thomas Garcia MD  Primary Care Physician - Gilmar Mcdonald MD     SUBJECTIVE   Sitting in a chair, looks well    OBJECTIVE   VITALS    height is 5' 7\" (1.702 m) and weight is 201 lb 8 oz (91.4 kg). His oral temperature is 97.5 °F (36.4 °C). His blood pressure is 185/71 (abnormal) and his pulse is 66. His respiration is 20 and oxygen saturation is 96%. Body mass index is 31.56 kg/m². Temperature Range: Temp: 97.5 °F (36.4 °C) Temp  Av.5 °F (36.4 °C)  Min: 97.3 °F (36.3 °C)  Max: 97.6 °F (36.4 °C)  BP Range:  Systolic (35BXB), KCW:905 , Min:138 , HQZ:692     Diastolic (02RYB), ZOC:11, Min:54, Max:71    Pulse Range: Pulse  Av  Min: 66  Max: 95  Respiration Range: Resp  Av  Min: 16  Max: 20  Current Pulse Ox[de-identified]  SpO2: 96 %  24HR Pulse Ox Range:  SpO2  Av.5 %  Min: 93 %  Max: 98 %  Oxygen Amount and Delivery: O2 Flow Rate (L/min): 2 L/min    Wt Readings from Last 3 Encounters:   20 201 lb 8 oz (91.4 kg)   20 200 lb 12.8 oz (91.1 kg)   19 205 lb (93 kg)       I/O (24 Hours)  No intake or output data in the 24 hours ending 20 1135    EXAM     General Appearance  Awake, alert, oriented, in no acute distress  HEENT - normocephalic, atraumatic.  []  Mallampati  [] Crowded airway   [] Macroglossia  []  Retrognathia  [] Micrognathia  []  Normal tongue size []  Normal Bite  [] Poli sign positive    Neck - Supple,  trachea midline   Lungs -wheezes bilaterally  Cardiovascular - Heart sounds are normal.  Regular rate and rhythm   Abdomen - Soft, nontender, nondistended, no masses or organomegaly  Neurologic - There are no focal motor or sensory deficits  Skin - No bruising or bleeding  Extremities - No clubbing, cyanosis, edema    MEDS      methylPREDNISolone  40 mg Intravenous Q8H    pantoprazole  40 mg Oral QAM AC    docusate sodium  100 mg Oral Daily    ipratropium-albuterol  1 ampule Inhalation Q4H    cefTRIAXone (ROCEPHIN) IV  1 g Intravenous Q24H    tamsulosin  0.4 mg Oral Nightly    budesonide-formoterol  2 puff Inhalation BID    sodium chloride flush  10 mL Intravenous 2 times per day    enoxaparin  40 mg Subcutaneous Daily    aspirin EC  81 mg Oral Daily    Vitamin D  2,000 Units Oral Daily    amLODIPine  5 mg Oral Daily    And    lisinopril  10 mg Oral Daily      sodium chloride 50 mL/hr at 02/08/20 0853     lactulose, potassium chloride **OR** potassium alternative oral replacement **OR** potassium chloride, potassium chloride, aluminum & magnesium hydroxide-simethicone, sodium chloride flush, acetaminophen, ondansetron, albuterol    LABS   CBC   Recent Labs     02/09/20  0619   WBC 12.7*   HGB 13.7   HCT 40.9*   MCV 92.7        BMP:   Lab Results   Component Value Date     02/09/2020    K 5.1 02/09/2020     02/09/2020    CO2 24 02/09/2020    BUN 26 02/09/2020    LABALBU 3.7 02/03/2020    CREATININE 1.06 02/09/2020    CALCIUM 8.9 02/09/2020    GFRAA >60 02/09/2020    LABGLOM >60 02/09/2020     ABGs:  Lab Results   Component Value Date    PHART 7.460 02/04/2020    PO2ART 68.6 02/04/2020    OFM0KIU 33.3 02/04/2020      Lab Results   Component Value Date    MODE 12/6 30% O2 02/04/2020     Ionized Calcium:  No results found for: IONCA  Magnesium:    Lab Results   Component Value Date    MG 2.9 02/05/2020     Phosphorus:  No results found for: PHOS     LIVER PROFILE No results for input(s): AST, ALT, LIPASE, BILIDIR, BILITOT, ALKPHOS in the last 72 hours. Invalid input(s): AMYLASE,  ALB  INR No results for input(s): INR in the last 72 hours.   PTT No results found for: APTT      RADIOLOGY     (See actual reports for details)    ASSESSMENT/PLAN   Principal Problem:    Hypoxia  Active Problems:    COPD exacerbation (HCC)    HTN (hypertension)    Dyslipidemia    Hearing loss    Obesity, Class I, BMI 30-34.9    Multifocal pneumonia  Resolved Problems:    * No resolved hospital problems.  *      Still wheezing, will still reduce Solu-Medrol because he appears to be improving clinically   Not ready for discharge today  Will need home oxygen evaluation prior to discharge  He has a nebulizer but no medications  Can Hep-Lock IV  Switch to oral Ceftin 500 mg twice a day for 4 more days at the time of discharge  We will also need prednisone taper  Follow-up in the office within 4 weeks  Electronically signed by Jacob Owens MD on 2/9/2020 at 11:35 AM

## 2020-02-09 NOTE — PROGRESS NOTES
Medication Dose Route Frequency Provider Last Rate Last Dose    methylPREDNISolone sodium (SOLU-MEDROL) injection 40 mg  40 mg Intravenous Q8H Juan Diego Locke MD   40 mg at 02/09/20 0055    lactulose (CHRONULAC) 10 GM/15ML solution 20 g  20 g Oral BID PRKIARA Elizabeth MD   20 g at 02/06/20 1845    pantoprazole (PROTONIX) tablet 40 mg  40 mg Oral QAM AC Donnie Elizabeth MD   40 mg at 02/09/20 2445    potassium chloride (KLOR-CON M) extended release tablet 40 mEq  40 mEq Oral PRN Donnie Elizabeth MD   40 mEq at 02/05/20 1909    Or    potassium bicarb-citric acid (EFFER-K) effervescent tablet 40 mEq  40 mEq Oral PRKIARA Elizabeth MD        Or    potassium chloride 10 mEq/100 mL IVPB (Peripheral Line)  10 mEq Intravenous PRN Donnie Elizabeth MD        potassium chloride 10 mEq/100 mL IVPB (Peripheral Line)  10 mEq Intravenous PRN Donnie Elizabeth MD        aluminum & magnesium hydroxide-simethicone (MAALOX) 200-200-20 MG/5ML suspension 30 mL  30 mL Oral Q6H PRN Donnie Elizabeth MD   30 mL at 02/05/20 1244    docusate sodium (COLACE) capsule 100 mg  100 mg Oral Daily Samanta Mckenna MD   100 mg at 02/08/20 0853    ipratropium-albuterol (DUONEB) nebulizer solution 1 ampule  1 ampule Inhalation Q4H Juan Diego Locke MD   1 ampule at 02/09/20 0359    cefTRIAXone (ROCEPHIN) 1 g IVPB in 50 mL D5W minibag  1 g Intravenous Q24H Juan Diego Locke MD   Stopped at 02/08/20 1916    0.9 % sodium chloride infusion   Intravenous Continuous Juan Diego Locke MD 50 mL/hr at 02/08/20 0853      tamsulosin (FLOMAX) capsule 0.4 mg  0.4 mg Oral Nightly Donnie Elizabeth MD   0.4 mg at 02/08/20 1955    budesonide-formoterol (SYMBICORT) 80-4.5 MCG/ACT inhaler 2 puff  2 puff Inhalation BID Donnie Elizabeth MD   2 puff at 02/08/20 1955    sodium chloride flush 0.9 % injection 10 mL  10 mL Intravenous 2 times per day Myah Hills DO   10 mL at 02/05/20 2040    sodium chloride flush 0.9 % injection 10 mL  10 mL Estimate NOT REPORTED    Basic Metabolic Panel w/ Reflex to MG    Collection Time: 02/09/20  6:19 AM   Result Value Ref Range    Glucose 156 (H) 70 - 99 mg/dL    BUN 26 (H) 8 - 23 mg/dL    CREATININE 1.06 0.70 - 1.20 mg/dL    Bun/Cre Ratio NOT REPORTED 9 - 20    Calcium 8.9 8.6 - 10.4 mg/dL    Sodium 139 135 - 144 mmol/L    Potassium 5.1 3.7 - 5.3 mmol/L    Chloride 105 98 - 107 mmol/L    CO2 24 20 - 31 mmol/L    Anion Gap 10 9 - 17 mmol/L    GFR Non-African American >60 >60 mL/min    GFR African American >60 >60 mL/min    GFR Comment          GFR Staging NOT REPORTED      -----------------------------------------------------------------  RAD:  EKG:  Micro:     Assessment & Plan:    Patient Active Problem List:     History of shingles     COPD exacerbation (HCC)     HTN (hypertension)     Dyslipidemia     Hearing loss     HUY (acute kidney injury) ([facility])     Overweight (BMI 25.0-29. 9)     Fecal impaction in rectum ([facility])     Hypoxia     Obesity, Class I, BMI 30-34.9           Plan:  COPD exacerbation - improving, Pulmonology following. Multifocal pneumonia - switch to oral Ceftin. Steroids being tapered. Continue current treatments.     See orders   Disposition:    Electronically signed by Jaye Alas MD on 2/9/2020 at 7:09 AM

## 2020-02-09 NOTE — PLAN OF CARE
Problem: Falls - Risk of:  Goal: Will remain free from falls  Description  Will remain free from falls  2/9/2020 1733 by Anila Gonzales RN  Outcome: Ongoing     Problem: Falls - Risk of:  Goal: Absence of physical injury  Description  Absence of physical injury  2/9/2020 1733 by Anila Gonzales RN  Outcome: Ongoing     Problem: Airway Clearance - Ineffective:  Goal: Ability to maintain a clear airway will improve  Description  Ability to maintain a clear airway will improve  2/9/2020 1733 by Anila Gonzales RN  Outcome: Ongoing     Problem: COMMUNICATION IMPAIRMENT  Goal: Ability to express needs and understand communication  2/9/2020 1733 by Anila Gonzales RN  Outcome: Ongoing

## 2020-02-09 NOTE — DISCHARGE INSTR - COC
Continuity of Care Form    Patient Name: Timothy Velazco   :  1945  MRN:  [id number]    Admit date:  2/3/2020  Discharge date:  ***    Code Status Order: Full Code   Advance Directives:   Advance Care Flowsheet Documentation     Date/Time Healthcare Directive Type of Healthcare Directive Copy in [address] Agent's Name Healthcare Agent's Phone Number    20 [phone]  No, patient does not have an advance directive for healthcare treatment -- -- -- -- --          Admitting Physician:  Miguelito Clarke MD  PCP: Miguelito Clarke MD    Discharging Nurse: [facility] Unit/Room#: 2092/2092-01  Discharging Unit Phone Number: ***    Emergency Contact:   Extended Emergency Contact Information  Primary Emergency Contact: OtisCarmen  Address: [address] [place] [address] Phone: [phone]  Relation: Spouse    Past Surgical History:  History reviewed. No pertinent surgical history. Immunization History:   Immunization History   Administered Date(s) Administered    Influenza 10/01/2012, 2013    Influenza, High Dose (Fluzone 65 yrs and older) 2015, 10/15/2015, 10/09/2017, 2018    Influenza, Triv, inactivated, subunit, adjuvanted, IM (Fluad 65 yrs and older) 2019    Pneumococcal Conjugate 13-valent ([id number]) 2017    Pneumococcal Polysaccharide ([id number]) 2015       Active Problems:  Patient Active Problem List   Diagnosis Code    History of shingles Z86.19    COPD exacerbation ([facility] [address] J44.1    HTN (hypertension) I10    Dyslipidemia E78.5    Hearing loss H91.90    HUY (acute kidney injury) ([facility] [address] N17.9    Overweight (BMI 25.0-29. 9) E66.3    Fecal impaction in rectum (HCC) K56.41    Hypoxia R09.02    Obesity, Class I, BMI 30-34.9 E66.9    Multifocal pneumonia J18.9       Isolation/Infection:   Isolation          No Isolation        Patient Infection Status     None to display          Nurse Assessment:  Last Vital Signs: BP (!) 169/69   Pulse 71   Temp 97.6 °F (36.4 °C) (Oral)   Resp 16   Ht 5' 7\" (1.702 m)   Wt 201 lb 8 oz (91.4 kg)   SpO2 96%   BMI 31.56 kg/m²     Last documented pain score (0-10 scale): Pain Level: 0  Last Weight:   Wt Readings from Last 1 Encounters:   20 201 lb 8 oz (91.4 kg)     Mental Status:  {IP PT MENTAL STATUS:}    IV Access:  { UMA IV ACCESS:693112406}    Nursing Mobility/ADLs:  Walking   {CHP DME FFGH:451443440}  Transfer  {CHP DME TCPA:623767763}  Bathing  {CHP DME JSFJ:921143158}  Dressing  {CHP DME HABF:608900995}  Toileting  {CHP DME RMHH:768899715}  Feeding  {CHP DME QZRY:927071598}  Med Admin  {P DME ZTEU:585506552}  Med Delivery   {[facility] [place] Delivery:782567838}    Wound Care Documentation and Therapy:        Elimination:  Continence:   · Bowel: {YES / RL:35310}  · Bladder: {YES / SO:77832}  Urinary Catheter: {Urinary Catheter:320714966}   Colostomy/Ileostomy/Ileal Conduit: {YES / NK:09866}       Date of Last BM: ***  No intake or output data in the 24 hours ending 20 0714  No intake/output data recorded.     Safety Concerns:     [facility] Safety Concerns:244271847}    Impairments/Disabilities:      [facility] Impairments/Disabilities:093203307}    Nutrition Therapy:  Current Nutrition Therapy:   [facility] Diet List:034535146}    Routes of Feeding: {[facility] DME Other Feedings:963871059}  Liquids: {Slp liquid thickness:20760}  Daily Fluid Restriction: {CHP DME Yes amt example:962314581}  Last Modified Barium Swallow with Video (Video Swallowing Test): {Done Not Done BYQF:068689309}    Treatments at the Time of Hospital Discharge:   Respiratory Treatments: ***  Oxygen Therapy:  {Therapy; copd oxygen:31517}  Ventilator:    { CC Vent XWEW:361974636}    Rehab Therapies: {THERAPEUTIC INTERVENTION:2868121635}  Weight Bearing Status/Restrictions: [address] [facility] Weight Bearin}  Other Medical Equipment (for information only, NOT a DME order):

## 2020-02-10 VITALS
RESPIRATION RATE: 16 BRPM | SYSTOLIC BLOOD PRESSURE: 149 MMHG | TEMPERATURE: 97.7 F | DIASTOLIC BLOOD PRESSURE: 55 MMHG | OXYGEN SATURATION: 97 % | BODY MASS INDEX: 31.63 KG/M2 | WEIGHT: 201.5 LBS | HEIGHT: 67 IN | HEART RATE: 69 BPM

## 2020-02-10 LAB
ABSOLUTE BANDS #: 0.25 K/UL (ref 0–1)
ABSOLUTE EOS #: 0 K/UL (ref 0–0.4)
ABSOLUTE IMMATURE GRANULOCYTE: ABNORMAL K/UL (ref 0–0.3)
ABSOLUTE LYMPH #: 1.25 K/UL (ref 1–4.8)
ABSOLUTE MONO #: 0.63 K/UL (ref 0.1–1.3)
ANION GAP SERPL CALCULATED.3IONS-SCNC: 8 MMOL/L (ref 9–17)
BANDS: 2 % (ref 0–10)
BASOPHILS # BLD: 0 % (ref 0–2)
BASOPHILS ABSOLUTE: 0 K/UL (ref 0–0.2)
BUN BLDV-MCNC: 28 MG/DL (ref 8–23)
BUN/CREAT BLD: ABNORMAL (ref 9–20)
CALCIUM SERPL-MCNC: 8.7 MG/DL (ref 8.6–10.4)
CHLORIDE BLD-SCNC: 105 MMOL/L (ref 98–107)
CO2: 25 MMOL/L (ref 20–31)
CREAT SERPL-MCNC: 1.05 MG/DL (ref 0.7–1.2)
DIFFERENTIAL TYPE: ABNORMAL
EOSINOPHILS RELATIVE PERCENT: 0 % (ref 0–4)
GFR AFRICAN AMERICAN: >60 ML/MIN
GFR NON-AFRICAN AMERICAN: >60 ML/MIN
GFR SERPL CREATININE-BSD FRML MDRD: ABNORMAL ML/MIN/{1.73_M2}
GFR SERPL CREATININE-BSD FRML MDRD: ABNORMAL ML/MIN/{1.73_M2}
GLUCOSE BLD-MCNC: 133 MG/DL (ref 70–99)
HCT VFR BLD CALC: 39.2 % (ref 41–53)
HEMOGLOBIN: 13.1 G/DL (ref 13.5–17.5)
IMMATURE GRANULOCYTES: ABNORMAL %
LYMPHOCYTES # BLD: 10 % (ref 24–44)
MCH RBC QN AUTO: 31.1 PG (ref 26–34)
MCHC RBC AUTO-ENTMCNC: 33.4 G/DL (ref 31–37)
MCV RBC AUTO: 92.9 FL (ref 80–100)
MONOCYTES # BLD: 5 % (ref 1–7)
MORPHOLOGY: ABNORMAL
MORPHOLOGY: ABNORMAL
NRBC AUTOMATED: ABNORMAL PER 100 WBC
PDW BLD-RTO: 12.9 % (ref 11.5–14.9)
PLATELET # BLD: 351 K/UL (ref 150–450)
PLATELET ESTIMATE: ABNORMAL
PMV BLD AUTO: 8.2 FL (ref 6–12)
POTASSIUM SERPL-SCNC: 5.2 MMOL/L (ref 3.7–5.3)
RBC # BLD: 4.22 M/UL (ref 4.5–5.9)
RBC # BLD: ABNORMAL 10*6/UL
SEG NEUTROPHILS: 83 % (ref 36–66)
SEGMENTED NEUTROPHILS ABSOLUTE COUNT: 10.37 K/UL (ref 1.3–9.1)
SODIUM BLD-SCNC: 138 MMOL/L (ref 135–144)
WBC # BLD: 12.5 K/UL (ref 3.5–11)
WBC # BLD: ABNORMAL 10*3/UL

## 2020-02-10 PROCEDURE — 94762 N-INVAS EAR/PLS OXIMTRY CONT: CPT

## 2020-02-10 PROCEDURE — 94640 AIRWAY INHALATION TREATMENT: CPT

## 2020-02-10 PROCEDURE — 36415 COLL VENOUS BLD VENIPUNCTURE: CPT

## 2020-02-10 PROCEDURE — 85025 COMPLETE CBC W/AUTO DIFF WBC: CPT

## 2020-02-10 PROCEDURE — 6360000002 HC RX W HCPCS: Performed by: INTERNAL MEDICINE

## 2020-02-10 PROCEDURE — 6360000002 HC RX W HCPCS: Performed by: STUDENT IN AN ORGANIZED HEALTH CARE EDUCATION/TRAINING PROGRAM

## 2020-02-10 PROCEDURE — 99239 HOSP IP/OBS DSCHRG MGMT >30: CPT | Performed by: FAMILY MEDICINE

## 2020-02-10 PROCEDURE — 94761 N-INVAS EAR/PLS OXIMETRY MLT: CPT

## 2020-02-10 PROCEDURE — 6370000000 HC RX 637 (ALT 250 FOR IP): Performed by: INTERNAL MEDICINE

## 2020-02-10 PROCEDURE — 6370000000 HC RX 637 (ALT 250 FOR IP): Performed by: FAMILY MEDICINE

## 2020-02-10 PROCEDURE — 2580000003 HC RX 258: Performed by: STUDENT IN AN ORGANIZED HEALTH CARE EDUCATION/TRAINING PROGRAM

## 2020-02-10 PROCEDURE — 80048 BASIC METABOLIC PNL TOTAL CA: CPT

## 2020-02-10 RX ORDER — PREDNISONE 10 MG/1
15 TABLET ORAL DAILY
Qty: 5 TABLET | Refills: 0 | Status: SHIPPED | OUTPATIENT
Start: 2020-02-10 | End: 2020-02-13

## 2020-02-10 RX ORDER — PREDNISONE 20 MG/1
20 TABLET ORAL DAILY
Qty: 3 TABLET | Refills: 0 | Status: SHIPPED | OUTPATIENT
Start: 2020-02-10 | End: 2020-02-13

## 2020-02-10 RX ORDER — PREDNISONE 1 MG/1
5 TABLET ORAL DAILY
Qty: 3 TABLET | Refills: 0 | Status: SHIPPED | OUTPATIENT
Start: 2020-02-10 | End: 2020-02-13

## 2020-02-10 RX ORDER — PREDNISONE 10 MG/1
10 TABLET ORAL DAILY
Qty: 3 TABLET | Refills: 0 | Status: SHIPPED | OUTPATIENT
Start: 2020-02-10 | End: 2020-02-13

## 2020-02-10 RX ADMIN — VITAMIN D, TAB 1000IU (100/BT) 2000 UNITS: 25 TAB at 08:04

## 2020-02-10 RX ADMIN — BUDESONIDE AND FORMOTEROL FUMARATE DIHYDRATE 2 PUFF: 80; 4.5 AEROSOL RESPIRATORY (INHALATION) at 08:04

## 2020-02-10 RX ADMIN — ENOXAPARIN SODIUM 40 MG: 40 INJECTION SUBCUTANEOUS at 08:04

## 2020-02-10 RX ADMIN — PANTOPRAZOLE SODIUM 40 MG: 40 TABLET, DELAYED RELEASE ORAL at 08:04

## 2020-02-10 RX ADMIN — DOCUSATE SODIUM 100 MG: 100 CAPSULE, LIQUID FILLED ORAL at 08:04

## 2020-02-10 RX ADMIN — METHYLPREDNISOLONE SODIUM SUCCINATE 40 MG: 40 INJECTION, POWDER, FOR SOLUTION INTRAMUSCULAR; INTRAVENOUS at 08:04

## 2020-02-10 RX ADMIN — ASPIRIN 81 MG: 81 TABLET, COATED ORAL at 08:04

## 2020-02-10 RX ADMIN — Medication 10 ML: at 08:05

## 2020-02-10 RX ADMIN — IPRATROPIUM BROMIDE AND ALBUTEROL SULFATE 1 AMPULE: .5; 3 SOLUTION RESPIRATORY (INHALATION) at 11:44

## 2020-02-10 RX ADMIN — LISINOPRIL 10 MG: 10 TABLET ORAL at 08:04

## 2020-02-10 RX ADMIN — IPRATROPIUM BROMIDE AND ALBUTEROL SULFATE 1 AMPULE: .5; 3 SOLUTION RESPIRATORY (INHALATION) at 08:17

## 2020-02-10 RX ADMIN — AMLODIPINE BESYLATE 5 MG: 5 TABLET ORAL at 08:04

## 2020-02-10 NOTE — PLAN OF CARE
Problem: Falls - Risk of:  Goal: Will remain free from falls  Description  Will remain free from falls  2/10/2020 0415 by Miriam Valencia RN  Outcome: Ongoing  Note:   Pt has remained free from falls this shift. Call light within reach. Side rails up X2. Bed in lowest position. Pt safety maintained. 2/9/2020 1733 by Momo Lan RN  Outcome: Ongoing  Goal: Absence of physical injury  Description  Absence of physical injury  2/10/2020 0415 by Miriam Valencia RN  Outcome: Ongoing  Note:   No injury this shift. Pt safety maintained.    2/9/2020 1733 by Momo Lan RN  Outcome: Ongoing     Problem: Airway Clearance - Ineffective:  Goal: Ability to maintain a clear airway will improve  Description  Ability to maintain a clear airway will improve  2/10/2020 0415 by Miriam Valencia RN  Outcome: Ongoing  2/9/2020 1733 by Momo Lan RN  Outcome: Ongoing     Problem: COMMUNICATION IMPAIRMENT  Goal: Ability to express needs and understand communication  2/10/2020 0415 by Miriam Valencia RN  Outcome: Ongoing  2/9/2020 1733 by Momo Lan RN  Outcome: Ongoing     Problem: Urinary Retention:  Goal: Urinary elimination within specified parameters  Description  Urinary elimination within specified parameters  Outcome: Ongoing  Goal: Ability to reestablish a normal urinary elimination pattern will improve - after catheter removal  Description  Ability to reestablish a normal urinary elimination pattern will improve - after catheter removal  Outcome: Ongoing

## 2020-02-10 NOTE — PROGRESS NOTES
Late entry. Patient was evaluated  . Note not documented then. My apologies. Pulmonary Progress Note  [facility]      Patient - Calli Tadeo,  Age - 69 y.o.    - 1945      Room Number - 2092/2092-01   N -  [id number]   [facility]  Date of Admission -  2/3/2020  3:35 PM        Consulting Jewel Medellin MD  Primary Care Physician - Arsalan Suarez MD     SUBJECTIVE   No acute distress. Seems pleasant. He can understand me as on his I speak slower. Denies increased shortness of breath from yesterday. OBJECTIVE   VITALS    height is 5' 7\" (1.702 m) and weight is 201 lb 8 oz (91.4 kg). His oral temperature is 97.7 °F (36.5 °C). His blood pressure is 149/55 (abnormal) and his pulse is 69. His respiration is 20 and oxygen saturation is 97%. Body mass index is 31.56 kg/m². Temperature Range: Temp: 97.7 °F (36.5 °C) Temp  Av.6 °F (36.4 °C)  Min: 97.5 °F (36.4 °C)  Max: 97.7 °F (36.5 °C)  BP Range:  Systolic (18LNV), HYL:164 , Min:149 , YMF:329     Diastolic (85LHM), YLI:70, Min:55, Max:67    Pulse Range: Pulse  Av.3  Min: 61  Max: 74  Respiration Range: Resp  Av  Min: 16  Max: 20  Current Pulse Ox[de-identified]  SpO2: 97 %  24HR Pulse Ox Range:  SpO2  Av.4 %  Min: 90 %  Max: 99 %  Oxygen Amount and Delivery: O2 Flow Rate (L/min): 0 L/min    Wt Readings from Last 3 Encounters:   20 201 lb 8 oz (91.4 kg)   20 200 lb 12.8 oz (91.1 kg)   19 205 lb (93 kg)       I/O (24 Hours)  No intake or output data in the 24 hours ending 02/10/20 1140    EXAM     General Appearance  Awake, alert, oriented, in no acute distress  HEENT - normocephalic, atraumatic. Neck - Supple,  trachea midline   Lungs -- wheezes appreciated Heart Exam:PMI normal. No lifts, heaves, or thrills. RRR. No murmurs, clicks, gallops, or rubs  Abdomen Exam: Abdomen soft, non-tender.    Extremity Exam: no gross pittingedema    MEDS HTN (hypertension)    Dyslipidemia    Hearing loss    HU[facility] ([facility]) [facility]    Overweight (BMI 25.0-29. 9)    Fecal impaction in rectum (HCC)    Hypoxia    Obesity, Class I, BMI 30-34.9    Multifocal pneumonia     Chest x-ray done on favor 6 reviewed. No change in the bilateral opacities. Patient's respiratory panel negative  Continue to wean O2  Continue to hopefully wean steroids  On Rocephin  On Lovenox for DVT.   On DuoNeb treatments    Electronically signed by Eddie Francisco MD on 2/10/2020 at 11:40 AM

## 2020-02-10 NOTE — PROGRESS NOTES
cyanosis  Skin - normal coloration and turgor, no rashes, no suspicious skin lesions noted    Data:   Medications:   Current Facility-Administered Medications   Medication Dose Route Frequency Provider Last Rate Last Dose    methylPREDNISolone sodium (SOLU-MEDROL) injection 40 mg  40 mg Intravenous Q12H Fidencio Francisco MD   40 mg at 02/09/20 2043    ipratropium-albuterol (DUONEB) nebulizer solution 1 ampule  1 ampule Inhalation 4x daily Fidencio Francisco MD   1 ampule at 02/09/20 2034    lactulose (CHRONULAC) 10 GM/15ML solution 20 g  20 g Oral BID PRN Pacheco Maciel MD   20 g at 02/06/20 1845    pantoprazole (PROTONIX) tablet 40 mg  40 mg Oral QAM AC Pacheco Maciel MD   40 mg at 02/09/20 3039    potassium chloride (KLOR-CON M) extended release tablet 40 mEq  40 mEq Oral PRN Pacheco Maciel MD   40 mEq at 02/05/20 1909    Or    potassium bicarb-citric acid (EFFER-K) effervescent tablet 40 mEq  40 mEq Oral PRN Pacheco Maciel MD        Or    potassium chloride 10 mEq/100 mL IVPB (Peripheral Line)  10 mEq Intravenous PRN Pacheoc Maciel MD        potassium chloride 10 mEq/100 mL IVPB (Peripheral Line)  10 mEq Intravenous PRN Pacheco Maciel MD        aluminum & magnesium hydroxide-simethicone (MAALOX) 077-011-57 MG/5ML suspension 30 mL  30 mL Oral Q6H PRN Pacheco Maciel MD   30 mL at 02/05/20 1244    docusate sodium (COLACE) capsule 100 mg  100 mg Oral Daily Melissa Crum MD   100 mg at 02/09/20 0835    cefTRIAXone (ROCEPHIN) 1 g IVPB in 50 mL D5W minibag  1 g Intravenous Q24H Fidencio Francisco MD   Stopped at 02/09/20 2043    tamsulosin (FLOMAX) capsule 0.4 mg  0.4 mg Oral Nightly Pacheco Maciel MD   0.4 mg at 02/09/20 2043    budesonide-formoterol (SYMBICORT) 80-4.5 MCG/ACT inhaler 2 puff  2 puff Inhalation BID Pacheco Maciel MD   2 puff at 02/09/20 2043    sodium chloride flush 0.9 % injection 10 mL  10 mL Intravenous 2 times per day Dawna Stern DO   10 mL at 02/09/20 6064    sodium chloride flush 0.9 % injection 10 mL  10 mL Intravenous PRN Dicky [place], DO   10 mL at 02/07/20 1208    acetaminophen (TYLENOL) tablet 650 mg  650 mg Oral Q4H PRN Carlosy [place], DO        enoxaparin (LOVENOX) injection 40 mg  40 mg Subcutaneous Daily Dicky [place], DO   40 mg at 02/09/20 0835    ondansetron (ZOFRAN) injection 4 mg  4 mg Intravenous Q8H PRN Carlosy [place], DO   4 mg at 02/05/20 1243    albuterol (PROVENTIL) nebulizer solution 2.5 mg  2.5 mg Nebulization Q6H PRN Cal Alicea MD        aspirin EC tablet 81 mg  81 mg Oral Daily Cal Alicea MD   81 mg at 02/09/20 1189    Vitamin D (CHOLECALCIFEROL) tablet 2,000 Units  2,000 Units Oral Daily Cal Alicea MD   2,000 Units at 02/09/20 0835    amLODIPine (NORVASC) tablet 5 mg  5 mg Oral Daily Cal Alicea MD   5 mg at 02/09/20 2069    And    lisinopril (PRINIVIL;ZESTRIL) tablet 10 mg  10 mg Oral Daily Cal Alicea MD   10 mg at 02/09/20 0835     No intake or output data in the 24 hours ending 02/10/20 0549  Recent Results (from the past 24 hour(s))   CBC with DIFF    Collection Time: 02/09/20  6:19 AM   Result Value Ref Range    WBC 12.7 (H) 3.5 - 11.0 k/uL    RBC 4.41 (L) 4.5 - 5.9 m/uL    Hemoglobin 13.7 13.5 - 17.5 g/dL    Hematocrit 40.9 (L) 41 - 53 %    MCV 92.7 80 - 100 fL    MCH 31.0 26 - 34 pg    MCHC 33.4 31 - 37 g/dL    RDW 12.6 11.5 - 14.9 %    Platelets 539 006 - 116 k/uL    MPV 8.3 6.0 - 12.0 fL    NRBC Automated NOT REPORTED per 100 WBC    Differential Type NOT REPORTED     Immature Granulocytes NOT REPORTED 0 %    Absolute Immature Granulocyte NOT REPORTED 0.00 - 0.30 k/uL    WBC Morphology NOT REPORTED     RBC Morphology NOT REPORTED     Platelet Estimate NOT REPORTED     Seg Neutrophils 91 (H) 36 - 66 %    Lymphocytes 7 (L) 24 - 44 %    Monocytes 2 1 - 7 %    Eosinophils % 0 0 - 4 %    Basophils 0 0 - 2 %    Segs Absolute 11.56 (H) 1.3 - 9.1 k/uL    Absolute Lymph # 0.89 (L) 1.0 - 4.8 k/uL

## 2020-02-13 NOTE — DISCHARGE SUMMARY
[facility]      Patient ID: David Foster    MRN: [id number]     Acct:  [de-identified]       Patient's PCP: Nader Savage MD    Admit Date: 2/3/2020     Discharge Date: 2/10/2020      Admitting Physician: Nader Savage MD    Discharge Physician: Nader Savage MD     Discharge Diagnoses:    Primary Problem  Hypoxia    Active Hospital Problems    Diagnosis Date Noted    Multifocal pneumonia [J18.9] 02/09/2020    Obesity, Class I, BMI 30-34.9 [E66.9] 02/04/2020    Hypoxia [R09.02] 02/03/2020    Hearing loss [H91.90] 06/11/2014    Dyslipidemia [E78.5] 12/11/2013    HTN (hypertension) [I10] 12/11/2013    COPD exacerbation ([facility] [J44.1]      Past Medical History:   Diagnosis Date    HUY (acute kidney injury) ([facility] 2/12/2018    COPD (chronic obstructive pulmonary disease) ([facility]     History of shingles 9/12    Hypertension     2012     The patient was seen and examined on day of discharge and this discharge summary is in conjunction with any daily progress note from day of discharge. Code Status:  Prior    Hospital Course: uncomplicated    Consults:  pulmonary/intensive care    Significant Diagnostic Studies: as above, and as follows: see chart    Treatments: as above    Disposition: home    Discharged Condition: Stable    Follow Up: Nader Savage MD in one week    Discharge Medications: Gabriel Bender   Home Medication Instructions YPQ:[id number]    Printed on:02/12/20 2020   Medication Information                      albuterol (PROVENTIL) (2.5 MG/3ML) 0.083% nebulizer solution  Take 3 mLs by nebulization every 6 hours as needed.              amLODIPine-benazepril (LOTREL) 5-10 MG per capsule  TAKE 1 CAPSULE BY MOUTH ONE TIME A DAY              aspirin EC 81 MG EC tablet  Take 81 mg by mouth daily             budesonide-formoterol (SYMBICORT) 80-4.5 MCG/ACT AERO  Inhale 2 puffs into the lungs 2 times daily             Cholecalciferol (VITAMIN D3) 2000 units CAPS  Take 2,000 Units by mouth daily              Omega-3 Fatty Acids (FISH OIL) 1000 MG CAPS  Take 1,000 mg by mouth nightly             predniSONE (DELTASONE) 10 MG tablet  Take 1.5 tablets by mouth daily for 3 days             predniSONE (DELTASONE) 10 MG tablet  Take 1 tablet by mouth daily for 3 days             predniSONE (DELTASONE) 20 MG tablet  Take 1 tablet by mouth daily for 3 days             predniSONE (DELTASONE) 5 MG tablet  Take 1 tablet by mouth daily for 3 days                  Activity: activity as tolerated    Diet: cardiac diet    Time Spent on discharge is more than 30 minutes in the examination, evaluation, counseling and review of medications and discharge plan.       Electronically signed by Mariano Shah MD on 2/12/2020 at 8:20 PM

## 2020-10-08 ENCOUNTER — HOSPITAL ENCOUNTER (OUTPATIENT)
Dept: GENERAL RADIOLOGY | Facility: CLINIC | Age: 75
Discharge: HOME | End: 2020-10-10
Payer: MEDICARE

## 2020-10-08 ENCOUNTER — HOSPITAL ENCOUNTER (OUTPATIENT)
Facility: CLINIC | Age: 75
Discharge: HOME | End: 2020-10-10
Payer: MEDICARE

## 2020-10-08 PROCEDURE — 71046 X-RAY EXAM CHEST 2 VIEWS: CPT

## 2021-04-01 ENCOUNTER — HOSPITAL ENCOUNTER (OUTPATIENT)
Age: 76
Setting detail: SPECIMEN
Discharge: HOME | End: 2021-04-01
Payer: MEDICARE

## 2021-04-01 DIAGNOSIS — E55.9 VITAMIN D DEFICIENCY: ICD-10-CM

## 2021-04-01 DIAGNOSIS — E78.5 DYSLIPIDEMIA: ICD-10-CM

## 2021-04-01 DIAGNOSIS — I10 ESSENTIAL HYPERTENSION: ICD-10-CM

## 2021-04-01 LAB
ABSOLUTE EOS #: 0.28 K/UL (ref 0–0.44)
ABSOLUTE IMMATURE GRANULOCYTE: <0.03 K/UL (ref 0–0.3)
ABSOLUTE LYMPH #: 1.8 K/UL (ref 1.1–3.7)
ABSOLUTE MONO #: 0.89 K/UL (ref 0.1–1.2)
ALBUMIN SERPL-MCNC: 4.2 G/DL (ref 3.5–5.2)
ALBUMIN/GLOBULIN RATIO: 1.4 (ref 1–2.5)
ALP BLD-CCNC: 49 U/L (ref 40–129)
ALT SERPL-CCNC: 25 U/L (ref 5–41)
ANION GAP SERPL CALCULATED.3IONS-SCNC: 13 MMOL/L (ref 9–17)
AST SERPL-CCNC: 21 U/L
BASOPHILS # BLD: 2 % (ref 0–2)
BASOPHILS ABSOLUTE: 0.12 K/UL (ref 0–0.2)
BILIRUB SERPL-MCNC: 0.46 MG/DL (ref 0.3–1.2)
BUN BLDV-MCNC: 20 MG/DL (ref 8–23)
BUN/CREAT BLD: NORMAL (ref 9–20)
CALCIUM SERPL-MCNC: 9.6 MG/DL (ref 8.6–10.4)
CHLORIDE BLD-SCNC: 102 MMOL/L (ref 98–107)
CHOLESTEROL/HDL RATIO: 3.1
CHOLESTEROL: 217 MG/DL
CO2: 26 MMOL/L (ref 20–31)
CREAT SERPL-MCNC: 1.14 MG/DL (ref 0.7–1.2)
DIFFERENTIAL TYPE: ABNORMAL
EOSINOPHILS RELATIVE PERCENT: 4 % (ref 1–4)
GFR AFRICAN AMERICAN: >60 ML/MIN
GFR NON-AFRICAN AMERICAN: >60 ML/MIN
GFR SERPL CREATININE-BSD FRML MDRD: NORMAL ML/MIN/{1.73_M2}
GFR SERPL CREATININE-BSD FRML MDRD: NORMAL ML/MIN/{1.73_M2}
GLUCOSE BLD-MCNC: 83 MG/DL (ref 70–99)
HCT VFR BLD CALC: 45.7 % (ref 40.7–50.3)
HDLC SERPL-MCNC: 70 MG/DL
HEMOGLOBIN: 14.4 G/DL (ref 13–17)
IMMATURE GRANULOCYTES: 0 %
LDL CHOLESTEROL: 128 MG/DL (ref 0–130)
LYMPHOCYTES # BLD: 23 % (ref 24–43)
MCH RBC QN AUTO: 31.4 PG (ref 25.2–33.5)
MCHC RBC AUTO-ENTMCNC: 31.5 G/DL (ref 28.4–34.8)
MCV RBC AUTO: 99.6 FL (ref 82.6–102.9)
MONOCYTES # BLD: 11 % (ref 3–12)
NRBC AUTOMATED: 0 PER 100 WBC
PDW BLD-RTO: 12.7 % (ref 11.8–14.4)
PLATELET # BLD: 296 K/UL (ref 138–453)
PLATELET ESTIMATE: ABNORMAL
PMV BLD AUTO: 10.9 FL (ref 8.1–13.5)
POTASSIUM SERPL-SCNC: 4.6 MMOL/L (ref 3.7–5.3)
RBC # BLD: 4.59 M/UL (ref 4.21–5.77)
RBC # BLD: ABNORMAL 10*6/UL
SEG NEUTROPHILS: 60 % (ref 36–65)
SEGMENTED NEUTROPHILS ABSOLUTE COUNT: 4.81 K/UL (ref 1.5–8.1)
SODIUM BLD-SCNC: 141 MMOL/L (ref 135–144)
TOTAL PROTEIN: 7.2 G/DL (ref 6.4–8.3)
TRIGL SERPL-MCNC: 95 MG/DL
VITAMIN D 25-HYDROXY: 43.8 NG/ML (ref 30–100)
VLDLC SERPL CALC-MCNC: ABNORMAL MG/DL (ref 1–30)
WBC # BLD: 7.9 K/UL (ref 3.5–11.3)
WBC # BLD: ABNORMAL 10*3/UL

## 2021-04-01 PROCEDURE — 82306 VITAMIN D 25 HYDROXY: CPT

## 2021-04-01 PROCEDURE — 80053 COMPREHEN METABOLIC PANEL: CPT

## 2021-04-01 PROCEDURE — 80061 LIPID PANEL: CPT

## 2021-04-01 PROCEDURE — 36415 COLL VENOUS BLD VENIPUNCTURE: CPT

## 2021-04-01 PROCEDURE — 85025 COMPLETE CBC W/AUTO DIFF WBC: CPT

## 2021-10-04 ENCOUNTER — HOSPITAL ENCOUNTER (OUTPATIENT)
Age: 76
Setting detail: SPECIMEN
Discharge: HOME | End: 2021-10-04
Payer: MEDICARE

## 2021-10-04 DIAGNOSIS — Z12.5 PROSTATE CANCER SCREENING: ICD-10-CM

## 2021-10-04 LAB — PROSTATE SPECIFIC ANTIGEN: 0.93 UG/L

## 2023-03-08 ENCOUNTER — HOSPITAL ENCOUNTER (OUTPATIENT)
Dept: CT IMAGING | Age: 78
Discharge: HOME | End: 2023-03-10
Payer: MEDICARE

## 2023-03-08 DIAGNOSIS — H90.5 CENTRAL HEARING LOSS: ICD-10-CM

## 2023-03-08 PROCEDURE — 70480 CT ORBIT/EAR/FOSSA W/O DYE: CPT

## 2023-03-14 ENCOUNTER — HOSPITAL ENCOUNTER (OUTPATIENT)
Dept: MRI IMAGING | Age: 78
Discharge: HOME | End: 2023-03-16
Payer: MEDICARE

## 2023-03-14 DIAGNOSIS — H90.5 CENTRAL HEARING LOSS: ICD-10-CM

## 2023-03-14 LAB
EGFR, POC: >60 ML/MIN/1.73M2
POC CREATININE: 1.11 MG/DL (ref 0.51–1.19)

## 2023-03-14 PROCEDURE — 70553 MRI BRAIN STEM W/O & W/DYE: CPT

## 2023-03-14 PROCEDURE — 6360000004 HC RX CONTRAST MEDICATION: Performed by: OTOLARYNGOLOGY

## 2023-03-14 PROCEDURE — 82565 ASSAY OF CREATININE: CPT

## 2023-03-14 PROCEDURE — A9579 GAD-BASE MR CONTRAST NOS,1ML: HCPCS | Performed by: OTOLARYNGOLOGY

## 2023-03-14 PROCEDURE — 2580000003 HC RX 258: Performed by: OTOLARYNGOLOGY

## 2023-03-14 RX ORDER — SODIUM CHLORIDE 0.9 % (FLUSH) 0.9 %
10 SYRINGE (ML) INJECTION PRN
Status: DISCONTINUED | OUTPATIENT
Start: 2023-03-14 | End: 2023-03-17 | Stop reason: HOSPADM

## 2023-03-14 RX ADMIN — SODIUM CHLORIDE, PRESERVATIVE FREE 10 ML: 5 INJECTION INTRAVENOUS at 08:31

## 2023-03-14 RX ADMIN — GADOTERIDOL 20 ML: 279.3 INJECTION, SOLUTION INTRAVENOUS at 08:30
